# Patient Record
Sex: FEMALE | Race: WHITE | NOT HISPANIC OR LATINO | Employment: FULL TIME | URBAN - NONMETROPOLITAN AREA
[De-identification: names, ages, dates, MRNs, and addresses within clinical notes are randomized per-mention and may not be internally consistent; named-entity substitution may affect disease eponyms.]

---

## 2017-01-10 ENCOUNTER — HOSPITAL ENCOUNTER (OUTPATIENT)
Dept: URGENT CARE | Facility: CLINIC | Age: 32
Discharge: HOME | End: 2017-01-10
Attending: FAMILY MEDICINE | Admitting: FAMILY MEDICINE

## 2017-02-28 ENCOUNTER — OFFICE VISIT (OUTPATIENT)
Dept: FAMILY MEDICINE CLINIC | Facility: CLINIC | Age: 32
End: 2017-02-28

## 2017-02-28 VITALS
WEIGHT: 293 LBS | DIASTOLIC BLOOD PRESSURE: 80 MMHG | HEIGHT: 67 IN | BODY MASS INDEX: 45.99 KG/M2 | SYSTOLIC BLOOD PRESSURE: 115 MMHG | HEART RATE: 85 BPM | OXYGEN SATURATION: 100 %

## 2017-02-28 DIAGNOSIS — Z00.00 ANNUAL PHYSICAL EXAM: Primary | ICD-10-CM

## 2017-02-28 DIAGNOSIS — E66.01 MORBID OBESITY DUE TO EXCESS CALORIES (HCC): ICD-10-CM

## 2017-02-28 PROCEDURE — 99395 PREV VISIT EST AGE 18-39: CPT | Performed by: GENERAL PRACTICE

## 2017-04-27 ENCOUNTER — OFFICE VISIT (OUTPATIENT)
Dept: FAMILY MEDICINE CLINIC | Facility: CLINIC | Age: 32
End: 2017-04-27

## 2017-04-27 VITALS
DIASTOLIC BLOOD PRESSURE: 70 MMHG | HEART RATE: 83 BPM | BODY MASS INDEX: 45.99 KG/M2 | WEIGHT: 293 LBS | SYSTOLIC BLOOD PRESSURE: 110 MMHG | OXYGEN SATURATION: 98 % | HEIGHT: 67 IN

## 2017-04-27 DIAGNOSIS — E66.01 MORBID OBESITY DUE TO EXCESS CALORIES (HCC): Primary | ICD-10-CM

## 2017-04-27 DIAGNOSIS — G43.909 MIGRAINE WITHOUT STATUS MIGRAINOSUS, NOT INTRACTABLE, UNSPECIFIED MIGRAINE TYPE: ICD-10-CM

## 2017-04-27 PROCEDURE — 99213 OFFICE O/P EST LOW 20 MIN: CPT | Performed by: GENERAL PRACTICE

## 2017-04-27 RX ORDER — SUMATRIPTAN 50 MG/1
TABLET, FILM COATED ORAL
Qty: 9 TABLET | Refills: 5 | Status: SHIPPED | OUTPATIENT
Start: 2017-04-27 | End: 2017-09-20 | Stop reason: SDUPTHER

## 2017-05-15 RX ORDER — LORCASERIN HYDROCHLORIDE HEMIHYDRATE 20 MG/1
TABLET, FILM COATED, EXTENDED RELEASE ORAL
Qty: 30 TABLET | Refills: 0 | OUTPATIENT
Start: 2017-05-15

## 2017-09-20 ENCOUNTER — TELEPHONE (OUTPATIENT)
Dept: FAMILY MEDICINE CLINIC | Facility: CLINIC | Age: 32
End: 2017-09-20

## 2017-09-20 RX ORDER — SUMATRIPTAN 50 MG/1
TABLET, FILM COATED ORAL
Qty: 9 TABLET | Refills: 1 | Status: SHIPPED | OUTPATIENT
Start: 2017-09-20 | End: 2018-02-23 | Stop reason: HOSPADM

## 2017-09-20 NOTE — TELEPHONE ENCOUNTER
Patient called, she states she only see's Dr. Ramirez yearly.    ----- Message from Patricia Woods sent at 9/20/2017 11:38 AM CDT -----  Contact: GRETCHEN Salvador needs refill for Imitrex to [facility] Dr Ramirez    [phone]

## 2017-10-02 RX ORDER — SUMATRIPTAN 50 MG/1
TABLET, FILM COATED ORAL
Qty: 9 TABLET | Refills: 0 | Status: SHIPPED | OUTPATIENT
Start: 2017-10-02 | End: 2018-02-23 | Stop reason: HOSPADM

## 2018-02-23 ENCOUNTER — INITIAL PRENATAL (OUTPATIENT)
Dept: OBSTETRICS AND GYNECOLOGY | Facility: CLINIC | Age: 33
End: 2018-02-23

## 2018-02-23 ENCOUNTER — APPOINTMENT (OUTPATIENT)
Dept: LAB | Facility: HOSPITAL | Age: 33
End: 2018-02-23

## 2018-02-23 VITALS — BODY MASS INDEX: 47.46 KG/M2 | SYSTOLIC BLOOD PRESSURE: 84 MMHG | DIASTOLIC BLOOD PRESSURE: 55 MMHG | WEIGHT: 293 LBS

## 2018-02-23 DIAGNOSIS — O34.219 HISTORY OF CESAREAN DELIVERY AFFECTING PREGNANCY: ICD-10-CM

## 2018-02-23 DIAGNOSIS — Z3A.00 WEEKS OF GESTATION OF PREGNANCY NOT SPECIFIED: ICD-10-CM

## 2018-02-23 DIAGNOSIS — Z86.32 HX GESTATIONAL DIABETES: Primary | ICD-10-CM

## 2018-02-23 DIAGNOSIS — Z34.80 PRENATAL CARE OF MULTIGRAVIDA, ANTEPARTUM: ICD-10-CM

## 2018-02-23 LAB
ABO GROUP BLD: NORMAL
AMPHET+METHAMPHET UR QL: NEGATIVE
BARBITURATES UR QL SCN: NEGATIVE
BASOPHILS # BLD AUTO: 0.01 10*3/MM3 (ref 0–0.2)
BASOPHILS NFR BLD AUTO: 0.1 % (ref 0–2)
BENZODIAZ UR QL SCN: NEGATIVE
BILIRUB UR QL STRIP: NEGATIVE
BLD GP AB SCN SERPL QL: NEGATIVE
CANNABINOIDS SERPL QL: NEGATIVE
CLARITY UR: CLEAR
COCAINE UR QL: NEGATIVE
COLOR UR: YELLOW
DEPRECATED RDW RBC AUTO: 40.8 FL (ref 36.4–46.3)
EOSINOPHIL # BLD AUTO: 0.06 10*3/MM3 (ref 0–0.7)
EOSINOPHIL NFR BLD AUTO: 0.7 % (ref 0–7)
ERYTHROCYTE [DISTWIDTH] IN BLOOD BY AUTOMATED COUNT: 13.4 % (ref 11.5–14.5)
GLUCOSE UR STRIP-MCNC: NEGATIVE MG/DL
HBV SURFACE AG SERPL QL IA: NEGATIVE
HCT VFR BLD AUTO: 41.2 % (ref 35–45)
HCV AB SER DONR QL: NEGATIVE
HGB BLD-MCNC: 13.5 G/DL (ref 12–15.5)
HGB UR QL STRIP.AUTO: NEGATIVE
HIV1+2 AB SER QL: NEGATIVE
IMM GRANULOCYTES # BLD: 0.02 10*3/MM3 (ref 0–0.02)
IMM GRANULOCYTES NFR BLD: 0.2 % (ref 0–0.5)
KETONES UR QL STRIP: NEGATIVE
LEUKOCYTE ESTERASE UR QL STRIP.AUTO: NEGATIVE
LYMPHOCYTES # BLD AUTO: 1.7 10*3/MM3 (ref 0.6–4.2)
LYMPHOCYTES NFR BLD AUTO: 19.7 % (ref 10–50)
Lab: NORMAL
MCH RBC QN AUTO: 27.5 PG (ref 26.5–34)
MCHC RBC AUTO-ENTMCNC: 32.8 G/DL (ref 31.4–36)
MCV RBC AUTO: 83.9 FL (ref 80–98)
METHADONE UR QL SCN: NEGATIVE
MONOCYTES # BLD AUTO: 0.72 10*3/MM3 (ref 0–0.9)
MONOCYTES NFR BLD AUTO: 8.3 % (ref 0–12)
NEUTROPHILS # BLD AUTO: 6.13 10*3/MM3 (ref 2–8.6)
NEUTROPHILS NFR BLD AUTO: 71 % (ref 37–80)
NITRITE UR QL STRIP: NEGATIVE
OPIATES UR QL: NEGATIVE
OXYCODONE UR QL SCN: NEGATIVE
PH UR STRIP.AUTO: 6 [PH] (ref 5–9)
PLATELET # BLD AUTO: 303 10*3/MM3 (ref 150–450)
PMV BLD AUTO: 10.2 FL (ref 8–12)
PROT UR QL STRIP: NEGATIVE
RBC # BLD AUTO: 4.91 10*6/MM3 (ref 3.77–5.16)
RH BLD: POSITIVE
RUBV IGG SER QL: ABNORMAL
RUBV IGG SER-ACNC: 67.6 IU/ML (ref 0–9.9)
SP GR UR STRIP: 1.01 (ref 1–1.03)
UROBILINOGEN UR QL STRIP: NORMAL
WBC NRBC COR # BLD: 8.64 10*3/MM3 (ref 3.2–9.8)

## 2018-02-23 PROCEDURE — 86762 RUBELLA ANTIBODY: CPT | Performed by: ADVANCED PRACTICE MIDWIFE

## 2018-02-23 PROCEDURE — 87086 URINE CULTURE/COLONY COUNT: CPT | Performed by: ADVANCED PRACTICE MIDWIFE

## 2018-02-23 PROCEDURE — 80307 DRUG TEST PRSMV CHEM ANLYZR: CPT | Performed by: ADVANCED PRACTICE MIDWIFE

## 2018-02-23 PROCEDURE — 86900 BLOOD TYPING SEROLOGIC ABO: CPT | Performed by: ADVANCED PRACTICE MIDWIFE

## 2018-02-23 PROCEDURE — 81003 URINALYSIS AUTO W/O SCOPE: CPT | Performed by: ADVANCED PRACTICE MIDWIFE

## 2018-02-23 PROCEDURE — 87340 HEPATITIS B SURFACE AG IA: CPT | Performed by: ADVANCED PRACTICE MIDWIFE

## 2018-02-23 PROCEDURE — 86803 HEPATITIS C AB TEST: CPT | Performed by: ADVANCED PRACTICE MIDWIFE

## 2018-02-23 PROCEDURE — 85025 COMPLETE CBC W/AUTO DIFF WBC: CPT | Performed by: ADVANCED PRACTICE MIDWIFE

## 2018-02-23 PROCEDURE — 86901 BLOOD TYPING SEROLOGIC RH(D): CPT | Performed by: ADVANCED PRACTICE MIDWIFE

## 2018-02-23 PROCEDURE — 86850 RBC ANTIBODY SCREEN: CPT | Performed by: ADVANCED PRACTICE MIDWIFE

## 2018-02-23 PROCEDURE — 87491 CHLMYD TRACH DNA AMP PROBE: CPT | Performed by: ADVANCED PRACTICE MIDWIFE

## 2018-02-23 PROCEDURE — 36415 COLL VENOUS BLD VENIPUNCTURE: CPT | Performed by: ADVANCED PRACTICE MIDWIFE

## 2018-02-23 PROCEDURE — 87661 TRICHOMONAS VAGINALIS AMPLIF: CPT | Performed by: ADVANCED PRACTICE MIDWIFE

## 2018-02-23 PROCEDURE — G0432 EIA HIV-1/HIV-2 SCREEN: HCPCS | Performed by: ADVANCED PRACTICE MIDWIFE

## 2018-02-23 PROCEDURE — 0501F PRENATAL FLOW SHEET: CPT | Performed by: ADVANCED PRACTICE MIDWIFE

## 2018-02-23 PROCEDURE — 87591 N.GONORRHOEAE DNA AMP PROB: CPT | Performed by: ADVANCED PRACTICE MIDWIFE

## 2018-02-23 RX ORDER — ONDANSETRON 4 MG/1
4 TABLET, FILM COATED ORAL DAILY PRN
Qty: 30 TABLET | Refills: 1 | Status: SHIPPED | OUTPATIENT
Start: 2018-02-23 | End: 2018-05-08

## 2018-02-23 RX ORDER — PRENATAL VIT/IRON FUM/FOLIC AC 65 MG-1 MG
1 TABLET ORAL DAILY
Qty: 30 EACH | Refills: 12 | Status: SHIPPED | OUTPATIENT
Start: 2018-02-23 | End: 2019-02-21

## 2018-02-23 NOTE — PROGRESS NOTES
CC: New OB visit, history reviewed      ROS:Positive nausea w/o vomiting   Negative leaking fluid from the vagina, swelling in her legs, headache and visual changes  Objective: See prenatal physical    Educated on: Spent 30 minutes out of 45 minutes face to face counseling on nutrition, activity, diet, safety, prenatal care, medications approved in pregnancy, pregnancy discomforts, and testing. Educated on the need for an early GTT since patient has a history of GDM & to avoid using Imitrex for migraines    A/Plan: f/u in 1 week for dating US   Madeleine was seen today for initial prenatal visit.    Diagnoses and all orders for this visit:    Hx gestational diabetes  -     Glucose, Post 50 Gm Glucola; Future    Weeks of gestation of pregnancy not specified  -      Ob Transvaginal; Future  -     Cancel: Type & Screen  -     OB Panel With HIV  -     Cancel: Urine Culture - Urine, Urine, Clean Catch  -     Cancel: Urinalysis With Microscopic - Urine, Clean Catch  -     Urine Drug Screen - Urine, Clean Catch  -     RPR  -     CBC Auto Differential  -     Hepatitis B Surface Antigen  -     Rubella Antibody, IgG  -     OB Panel Type & Screen  -     HIV-1 & HIV-2 Antibodies  -     PREVIOUS HISTORY; Future  -     PREVIOUS HISTORY    History of  delivery affecting pregnancy    Prenatal care of multigravida, antepartum  -     Hepatitis C Antibody  -     Urine Culture - Urine, Urine, Clean Catch  -     Urinalysis - Urine, Clean Catch  -     Chlamydia trachomatis, Neisseria gonorrhoeae, Trichomonas vaginalis, PCR - Urine, Urine, Clean Catch    Other orders  -     ondansetron (ZOFRAN) 4 MG tablet; Take 1 tablet by mouth Daily As Needed for Nausea or Vomiting.  -     Prenatal Vit-Fe Fumarate-FA (MYNATAL PLUS) tablet; Take 1 tablet by mouth Daily.

## 2018-02-24 LAB
BACTERIA SPEC AEROBE CULT: NORMAL
BACTERIA SPEC AEROBE CULT: NORMAL
C TRACH RRNA CVX QL NAA+PROBE: NEGATIVE
N GONORRHOEA RRNA SPEC QL NAA+PROBE: NEGATIVE
T VAGINALIS DNA VAG QL PROBE+SIG AMP: NEGATIVE

## 2018-02-25 LAB — RPR SER QL: NORMAL

## 2018-03-06 ENCOUNTER — LAB (OUTPATIENT)
Dept: LAB | Facility: HOSPITAL | Age: 33
End: 2018-03-06

## 2018-03-06 DIAGNOSIS — Z86.32 HX GESTATIONAL DIABETES: ICD-10-CM

## 2018-03-06 LAB — GLUCOSE 1H P 100 G GLC PO SERPL-MCNC: 117 MG/DL (ref 60–140)

## 2018-03-06 PROCEDURE — 36415 COLL VENOUS BLD VENIPUNCTURE: CPT

## 2018-03-06 PROCEDURE — 82950 GLUCOSE TEST: CPT

## 2018-03-07 ENCOUNTER — ROUTINE PRENATAL (OUTPATIENT)
Dept: OBSTETRICS AND GYNECOLOGY | Facility: CLINIC | Age: 33
End: 2018-03-07

## 2018-03-07 VITALS — BODY MASS INDEX: 47.3 KG/M2 | WEIGHT: 293 LBS | DIASTOLIC BLOOD PRESSURE: 83 MMHG | SYSTOLIC BLOOD PRESSURE: 126 MMHG

## 2018-03-07 DIAGNOSIS — Z34.80 ENCOUNTER FOR SUPERVISION OF NORMAL INTRAUTERINE PREGNANCY IN MULTIGRAVIDA, ANTEPARTUM: Primary | ICD-10-CM

## 2018-03-07 DIAGNOSIS — Z3A.00 WEEKS OF GESTATION OF PREGNANCY NOT SPECIFIED: ICD-10-CM

## 2018-03-07 PROCEDURE — 0502F SUBSEQUENT PRENATAL CARE: CPT | Performed by: ADVANCED PRACTICE MIDWIFE

## 2018-03-08 NOTE — PROGRESS NOTES
CC: ADELFO visit, history reviewed     ROS:Positive Denies   Negative leaking fluid from the vagina and swelling in her legs      Educated on:US & lab results    A/Plan: f/u in 4 week/s   Madeleine was seen today for routine prenatal visit.    Diagnoses and all orders for this visit:    Encounter for supervision of normal intrauterine pregnancy in multigravida, antepartum    Weeks of gestation of pregnancy not specified

## 2018-04-05 ENCOUNTER — ROUTINE PRENATAL (OUTPATIENT)
Dept: OBSTETRICS AND GYNECOLOGY | Facility: CLINIC | Age: 33
End: 2018-04-05

## 2018-04-05 VITALS — WEIGHT: 293 LBS | DIASTOLIC BLOOD PRESSURE: 77 MMHG | BODY MASS INDEX: 46.67 KG/M2 | SYSTOLIC BLOOD PRESSURE: 125 MMHG

## 2018-04-05 DIAGNOSIS — O21.9 NAUSEA AND VOMITING DURING PREGNANCY: Primary | ICD-10-CM

## 2018-04-05 DIAGNOSIS — Z3A.13 13 WEEKS GESTATION OF PREGNANCY: ICD-10-CM

## 2018-04-05 DIAGNOSIS — O26.811 FATIGUE DURING PREGNANCY IN FIRST TRIMESTER: ICD-10-CM

## 2018-04-05 PROCEDURE — 0502F SUBSEQUENT PRENATAL CARE: CPT | Performed by: ADVANCED PRACTICE MIDWIFE

## 2018-04-05 NOTE — PROGRESS NOTES
CC: ADELFO visit, history reviewed, no changes noted    ROS:Positive daily N&V & fatigue   Negative leaking fluid from the vagina, swelling in her legs, headache, visual changes, low back pain and heartburn      Educated on:Comfort measures, dietary changes, & medications for nausea, Vit B 6 for nausea & fatique    A/Plan: f/u in 5 week/s   Madeleine was seen today for routine prenatal visit.    Diagnoses and all orders for this visit:    Nausea and vomiting during pregnancy    13 weeks gestation of pregnancy  -     US Ob 14 + Weeks Single or First Gestation; Future

## 2018-05-08 ENCOUNTER — ROUTINE PRENATAL (OUTPATIENT)
Dept: OBSTETRICS AND GYNECOLOGY | Facility: CLINIC | Age: 33
End: 2018-05-08

## 2018-05-08 VITALS — WEIGHT: 293 LBS | BODY MASS INDEX: 47.3 KG/M2 | SYSTOLIC BLOOD PRESSURE: 120 MMHG | DIASTOLIC BLOOD PRESSURE: 68 MMHG

## 2018-05-08 DIAGNOSIS — O99.212 OBESITY AFFECTING PREGNANCY IN SECOND TRIMESTER: ICD-10-CM

## 2018-05-08 DIAGNOSIS — Z3A.17 17 WEEKS GESTATION OF PREGNANCY: Primary | ICD-10-CM

## 2018-05-08 PROCEDURE — 0502F SUBSEQUENT PRENATAL CARE: CPT | Performed by: ADVANCED PRACTICE MIDWIFE

## 2018-05-08 RX ORDER — PNV NO.95/FERROUS FUM/FOLIC AC 28MG-0.8MG
TABLET ORAL
Refills: 10 | COMMUNITY
Start: 2018-04-28 | End: 2018-05-08 | Stop reason: SDUPTHER

## 2018-05-10 ENCOUNTER — OFFICE VISIT (OUTPATIENT)
Dept: FAMILY MEDICINE CLINIC | Facility: CLINIC | Age: 33
End: 2018-05-10

## 2018-05-10 VITALS
SYSTOLIC BLOOD PRESSURE: 110 MMHG | OXYGEN SATURATION: 98 % | HEIGHT: 66 IN | WEIGHT: 293 LBS | BODY MASS INDEX: 47.09 KG/M2 | HEART RATE: 80 BPM | DIASTOLIC BLOOD PRESSURE: 70 MMHG

## 2018-05-10 DIAGNOSIS — R21 FACIAL RASH: Primary | ICD-10-CM

## 2018-05-10 PROCEDURE — 99213 OFFICE O/P EST LOW 20 MIN: CPT | Performed by: GENERAL PRACTICE

## 2018-05-10 RX ORDER — DIAPER,BRIEF,INFANT-TODD,DISP
EACH MISCELLANEOUS 2 TIMES DAILY
Qty: 30 G | Refills: 0 | Status: SHIPPED | OUTPATIENT
Start: 2018-05-10 | End: 2018-10-06 | Stop reason: HOSPADM

## 2018-05-10 NOTE — PROGRESS NOTES
Subjective   Madeleine Brooks is a 32 y.o. female.   Chief Complaint   Patient presents with   • Follow-up     pjossible shingles     Rash   This is a new problem. The current episode started in the past 7 days. The problem is unchanged. The affected locations include the face. The rash is characterized by burning, itchiness and redness. She was exposed to nothing. Pertinent negatives include no anorexia, congestion, cough, diarrhea, facial edema, fatigue, fever, joint pain, rhinorrhea, shortness of breath, sore throat or vomiting. Past treatments include moisturizer. The treatment provided mild relief.   Is 18 weeks pregnant. No new exposures.     The following portions of the patient's history were reviewed and updated as appropriate: allergies, current medications, past social history and problem list.    Outpatient Medications Prior to Visit   Medication Sig Dispense Refill   • Prenatal Vit-Fe Fumarate-FA (MYNATAL PLUS) tablet Take 1 tablet by mouth Daily. 30 each 12     No facility-administered medications prior to visit.        Review of Systems   Constitutional: Negative.  Negative for chills, fatigue, fever and unexpected weight change.   HENT: Negative.  Negative for congestion, ear pain, hearing loss, nosebleeds, rhinorrhea, sneezing, sore throat and tinnitus.    Eyes: Negative.  Negative for discharge.   Respiratory: Negative.  Negative for cough, shortness of breath and wheezing.    Cardiovascular: Negative.  Negative for chest pain and palpitations.   Gastrointestinal: Negative.  Negative for abdominal pain, anorexia, constipation, diarrhea, nausea and vomiting.   Endocrine: Negative.    Genitourinary: Negative.  Negative for dysuria, frequency and urgency.   Musculoskeletal: Negative.  Negative for arthralgias, back pain, joint pain, joint swelling, myalgias and neck pain.   Skin: Positive for rash.   Allergic/Immunologic: Negative.    Neurological: Negative.  Negative for dizziness, weakness,  "numbness and headaches.   Hematological: Negative.  Negative for adenopathy.   Psychiatric/Behavioral: Negative.  Negative for dysphoric mood and sleep disturbance. The patient is not nervous/anxious.        Objective   Visit Vitals  /70   Pulse 80   Ht 167.6 cm (66\")   Wt (!) 137 kg (302 lb)   LMP  (LMP Unknown)   SpO2 98%   BMI 48.74 kg/m²     Physical Exam   Constitutional: She is oriented to person, place, and time. She appears well-developed and well-nourished. No distress.   HENT:   Head: Normocephalic and atraumatic.   Nose: Nose normal.   Mouth/Throat: Oropharynx is clear and moist.   Eyes: Conjunctivae and EOM are normal. Pupils are equal, round, and reactive to light. Right eye exhibits no discharge. Left eye exhibits no discharge.   Neck: No thyromegaly present.   Cardiovascular: Normal rate, regular rhythm, normal heart sounds and intact distal pulses.    Pulmonary/Chest: Effort normal and breath sounds normal.   Lymphadenopathy:     She has no cervical adenopathy.   Neurological: She is alert and oriented to person, place, and time.   Skin: Skin is warm and dry. Rash noted.   Pinkish rash over forehead, cheeks and chin - looks allergic, no vesicles   Psychiatric: She has a normal mood and affect.   Nursing note and vitals reviewed.    Assessment/Plan   Problem List Items Addressed This Visit     None      Visit Diagnoses     Facial rash    -  Primary    Relevant Medications    hydrocortisone 1 % cream         Topical hydrocortisone bid, nothing else on face except current moisturizer, avoid sun exposure. Recheck if not improving.      New Medications Ordered This Visit   Medications   • hydrocortisone 1 % cream     Sig: Apply  topically 2 (Two) Times a Day.     Dispense:  30 g     Refill:  0     No Follow-up on file.  "

## 2018-05-16 NOTE — PROGRESS NOTES
CC: ADELFO visit, history reviewed, no changes noted     ROS:Positive No complaints   Negative leaking fluid from the vagina, swelling in her legs, headache, visual changes, low back pain and heartburn      Educated on:US results    A/Plan: f/u in 5 week/s for F/U anatomy US  Madeleine was seen today for routine prenatal visit.    Diagnoses and all orders for this visit:    17 weeks gestation of pregnancy  -     US Ob Follow Up Transabdominal Approach; Future    Obesity affecting pregnancy in second trimester

## 2018-06-14 ENCOUNTER — ROUTINE PRENATAL (OUTPATIENT)
Dept: OBSTETRICS AND GYNECOLOGY | Facility: CLINIC | Age: 33
End: 2018-06-14

## 2018-06-14 VITALS — DIASTOLIC BLOOD PRESSURE: 68 MMHG | WEIGHT: 293 LBS | SYSTOLIC BLOOD PRESSURE: 110 MMHG | BODY MASS INDEX: 49.23 KG/M2

## 2018-06-14 DIAGNOSIS — Z3A.23 23 WEEKS GESTATION OF PREGNANCY: Primary | ICD-10-CM

## 2018-06-14 DIAGNOSIS — Z34.80 ENCOUNTER FOR SUPERVISION OF NORMAL PREGNANCY IN MULTIGRAVIDA: ICD-10-CM

## 2018-06-14 PROCEDURE — 0502F SUBSEQUENT PRENATAL CARE: CPT | Performed by: ADVANCED PRACTICE MIDWIFE

## 2018-06-18 ENCOUNTER — LAB (OUTPATIENT)
Dept: LAB | Facility: HOSPITAL | Age: 33
End: 2018-06-18

## 2018-06-18 DIAGNOSIS — Z34.80 ENCOUNTER FOR SUPERVISION OF NORMAL PREGNANCY IN MULTIGRAVIDA: ICD-10-CM

## 2018-06-18 DIAGNOSIS — Z3A.23 23 WEEKS GESTATION OF PREGNANCY: ICD-10-CM

## 2018-06-18 LAB
DEPRECATED RDW RBC AUTO: 44.8 FL (ref 36.4–46.3)
ERYTHROCYTE [DISTWIDTH] IN BLOOD BY AUTOMATED COUNT: 14.4 % (ref 11.5–14.5)
GLUCOSE 1H P 100 G GLC PO SERPL-MCNC: 142 MG/DL (ref 60–140)
HCT VFR BLD AUTO: 37.6 % (ref 35–45)
HGB BLD-MCNC: 12.4 G/DL (ref 12–15.5)
MCH RBC QN AUTO: 28.6 PG (ref 26.5–34)
MCHC RBC AUTO-ENTMCNC: 33 G/DL (ref 31.4–36)
MCV RBC AUTO: 86.8 FL (ref 80–98)
PLATELET # BLD AUTO: 231 10*3/MM3 (ref 150–450)
PMV BLD AUTO: 10.5 FL (ref 8–12)
RBC # BLD AUTO: 4.33 10*6/MM3 (ref 3.77–5.16)
WBC NRBC COR # BLD: 7.74 10*3/MM3 (ref 3.2–9.8)

## 2018-06-18 PROCEDURE — 82950 GLUCOSE TEST: CPT

## 2018-06-18 PROCEDURE — 85027 COMPLETE CBC AUTOMATED: CPT

## 2018-06-18 PROCEDURE — 36415 COLL VENOUS BLD VENIPUNCTURE: CPT

## 2018-06-19 DIAGNOSIS — Z3A.23 23 WEEKS GESTATION OF PREGNANCY: Primary | ICD-10-CM

## 2018-06-25 ENCOUNTER — LAB (OUTPATIENT)
Dept: LAB | Facility: HOSPITAL | Age: 33
End: 2018-06-25

## 2018-06-25 DIAGNOSIS — Z3A.23 23 WEEKS GESTATION OF PREGNANCY: Primary | ICD-10-CM

## 2018-06-25 LAB
GLUCOSE 1H P 100 G GLC PO SERPL-MCNC: 151 MG/DL (ref 60–140)
GLUCOSE 2H P 100 G GLC PO SERPL-MCNC: 130 MG/DL (ref 50–399)
GLUCOSE 3H P 100 G GLC PO SERPL-MCNC: 54 MG/DL (ref 50–400)
GLUCOSE P FAST SERPL-MCNC: 92 MG/DL (ref 60–110)

## 2018-06-25 PROCEDURE — 82951 GLUCOSE TOLERANCE TEST (GTT): CPT

## 2018-06-25 PROCEDURE — 82952 GTT-ADDED SAMPLES: CPT

## 2018-06-25 PROCEDURE — 36415 COLL VENOUS BLD VENIPUNCTURE: CPT

## 2018-07-12 ENCOUNTER — ROUTINE PRENATAL (OUTPATIENT)
Dept: OBSTETRICS AND GYNECOLOGY | Facility: CLINIC | Age: 33
End: 2018-07-12

## 2018-07-12 VITALS — WEIGHT: 293 LBS | BODY MASS INDEX: 49.07 KG/M2 | DIASTOLIC BLOOD PRESSURE: 72 MMHG | SYSTOLIC BLOOD PRESSURE: 118 MMHG

## 2018-07-12 DIAGNOSIS — O99.212 OBESITY AFFECTING PREGNANCY IN SECOND TRIMESTER: ICD-10-CM

## 2018-07-12 DIAGNOSIS — O34.211 MATERNAL CARE DUE TO LOW TRANSVERSE UTERINE SCAR FROM PREVIOUS CESAREAN DELIVERY: ICD-10-CM

## 2018-07-12 DIAGNOSIS — Z3A.27 27 WEEKS GESTATION OF PREGNANCY: Primary | ICD-10-CM

## 2018-07-12 PROCEDURE — 0502F SUBSEQUENT PRENATAL CARE: CPT | Performed by: OBSTETRICS & GYNECOLOGY

## 2018-07-12 NOTE — PROGRESS NOTES
Chief Complaint   Patient presents with   • High Risk Gestation     32-year-old  with 1 previous delivery by  section of a 9 lbs. 8 oz. female infant.    2013  section at full-term delivering a 9 lbs. 8 oz. female infant named Linda.    She desires repeat  section and permanent surgical sterilization.  Signed sterilization papers 2018.    Having a boy named Waldemar.    Elevated 1 hour Glucola at 142.  Normal 3 hour GTT.  normal CBC.    Plan ultrasound at 32 weeks' gestation.    The patient complains of the following: No complaints    ROS  Vaginal bleeding: No   Nausea: No   Diarrhea: No   Constipation: No   Other:      Lab Results   Component Value Date    ABO O 2018    RH Positive 2018    ABSCRN Negative 2018       Specific topics discussed at today's visit:Fetal kick counts, repeat  section, tubal sterilization  Tests done today: none  Tests to be done at the next visit: none    Madeleine was seen today for high risk gestation.    Diagnoses and all orders for this visit:    27 weeks gestation of pregnancy    Maternal care due to low transverse uterine scar from previous  delivery    Obesity affecting pregnancy in second trimester

## 2018-07-31 ENCOUNTER — ROUTINE PRENATAL (OUTPATIENT)
Dept: OBSTETRICS AND GYNECOLOGY | Facility: CLINIC | Age: 33
End: 2018-07-31

## 2018-07-31 VITALS — DIASTOLIC BLOOD PRESSURE: 63 MMHG | SYSTOLIC BLOOD PRESSURE: 122 MMHG | BODY MASS INDEX: 49.55 KG/M2 | WEIGHT: 293 LBS

## 2018-07-31 DIAGNOSIS — O34.211 MATERNAL CARE DUE TO LOW TRANSVERSE UTERINE SCAR FROM PREVIOUS CESAREAN DELIVERY: ICD-10-CM

## 2018-07-31 DIAGNOSIS — O99.213 OBESITY AFFECTING PREGNANCY IN THIRD TRIMESTER: Primary | ICD-10-CM

## 2018-07-31 DIAGNOSIS — Z3A.29 29 WEEKS GESTATION OF PREGNANCY: ICD-10-CM

## 2018-07-31 DIAGNOSIS — O36.62X0 EXCESSIVE FETAL GROWTH AFFECTING MANAGEMENT OF PREGNANCY IN SECOND TRIMESTER, SINGLE OR UNSPECIFIED FETUS: ICD-10-CM

## 2018-07-31 PROCEDURE — 0502F SUBSEQUENT PRENATAL CARE: CPT | Performed by: OBSTETRICS & GYNECOLOGY

## 2018-07-31 NOTE — PROGRESS NOTES
Chief Complaint   Patient presents with   • High Risk Gestation     32-year-old  with 1 previous delivery by  section of a 9 lbs. 8 oz. female infant.     2013  section at full-term delivering a 9 lbs. 8 oz. female infant named Linda.     She desires repeat  section and permanent surgical sterilization.  Signed sterilization papers 2018.     Having a boy named Waldemar.     Elevated 1 hour Glucola at 142.  Normal 3 hour GTT.  normal CBC.     Plan ultrasound at 32 weeks' gestation.    ROS  Headache: No   Visual changes: No   Swelling in legs: No   Nausea: No   Constipation: No   Diarrhea: No   Contractions: No   Leaking fluid: No   Vaginal bleeding: No   Other:      Lab Results   Component Value Date    HGB 12.4 2018    HCT 37.6 2018    ABO O 2018    RH Positive 2018    ABSCRN Negative 2018       Specific topics discussed at today's visit: Fetal kick counts,  labor precautions  Tests done today: none    Madeleine was seen today for high risk gestation.    Diagnoses and all orders for this visit:    Obesity affecting pregnancy in third trimester  -     US Ob Follow Up Transabdominal Approach    29 weeks gestation of pregnancy    Excessive fetal growth affecting management of pregnancy in third trimester, single or unspecified fetus  -     US Ob Follow Up Transabdominal Approach    Maternal care due to low transverse uterine scar from previous  delivery

## 2018-08-16 ENCOUNTER — ROUTINE PRENATAL (OUTPATIENT)
Dept: OBSTETRICS AND GYNECOLOGY | Facility: CLINIC | Age: 33
End: 2018-08-16

## 2018-08-16 VITALS — WEIGHT: 293 LBS | SYSTOLIC BLOOD PRESSURE: 102 MMHG | BODY MASS INDEX: 49.23 KG/M2 | DIASTOLIC BLOOD PRESSURE: 68 MMHG

## 2018-08-16 DIAGNOSIS — O36.62X0 EXCESSIVE FETAL GROWTH AFFECTING MANAGEMENT OF PREGNANCY IN SECOND TRIMESTER, SINGLE OR UNSPECIFIED FETUS: ICD-10-CM

## 2018-08-16 DIAGNOSIS — O99.213 OBESITY AFFECTING PREGNANCY IN THIRD TRIMESTER: ICD-10-CM

## 2018-08-16 DIAGNOSIS — Z3A.32 32 WEEKS GESTATION OF PREGNANCY: Primary | ICD-10-CM

## 2018-08-16 DIAGNOSIS — O34.211 MATERNAL CARE DUE TO LOW TRANSVERSE UTERINE SCAR FROM PREVIOUS CESAREAN DELIVERY: ICD-10-CM

## 2018-08-16 PROCEDURE — 0502F SUBSEQUENT PRENATAL CARE: CPT | Performed by: OBSTETRICS & GYNECOLOGY

## 2018-08-16 NOTE — PROGRESS NOTES
Chief Complaint   Patient presents with   • High Risk Gestation     32-year-old  with 1 previous delivery by  section of a 9 lbs. 8 oz. female infant.     2013  section at full-term delivering a 9 lbs. 8 oz. female infant named Linda.     She desires repeat  section and permanent surgical sterilization.  Signed sterilization papers 2018.     Having a boy named Waldemar.     Elevated 1 hour Glucola at 142.  Normal 3 hour GTT.  normal CBC.     Plan ultrasound at 32 weeks' gestation.    Ultrasound 2018 that showed a single intrauterine pregnancy with estimated fetal weight 5 lbs. 7 oz. which is 82nd percentile.  Amniotic fluid index 19.0 cm.  No abnormalities noted.    Plan repeat  section and permanent surgical sterilization     The patient complains of the following: No complaints except she is beginning to have some breast leakage.    ROS  Headache: No   Visual changes: No   Swelling in legs: No   Nausea: No   Constipation: No   Diarrhea: No   Contractions: No   Leaking fluid: No   Vaginal bleeding: No   Other:      Specific topics discussed at today's visit: Fetal kick counts and  labor precautions  Tests done today: Obstetrical ultrasound  Tests to be done at the next visit: GBS swab    Madeleine was seen today for high risk gestation.    Diagnoses and all orders for this visit:    32 weeks gestation of pregnancy    Maternal care due to low transverse uterine scar from previous  delivery    Obesity affecting pregnancy in third trimester    Excessive fetal growth affecting management of pregnancy in third trimester, single or unspecified fetus

## 2018-09-04 ENCOUNTER — ROUTINE PRENATAL (OUTPATIENT)
Dept: OBSTETRICS AND GYNECOLOGY | Facility: CLINIC | Age: 33
End: 2018-09-04

## 2018-09-04 VITALS — SYSTOLIC BLOOD PRESSURE: 124 MMHG | DIASTOLIC BLOOD PRESSURE: 78 MMHG | BODY MASS INDEX: 49.23 KG/M2 | WEIGHT: 293 LBS

## 2018-09-04 DIAGNOSIS — O36.63X1: ICD-10-CM

## 2018-09-04 DIAGNOSIS — O99.213 OBESITY AFFECTING PREGNANCY IN THIRD TRIMESTER: ICD-10-CM

## 2018-09-04 DIAGNOSIS — Z3A.34 34 WEEKS GESTATION OF PREGNANCY: Primary | ICD-10-CM

## 2018-09-04 DIAGNOSIS — O34.211 MATERNAL CARE DUE TO LOW TRANSVERSE UTERINE SCAR FROM PREVIOUS CESAREAN DELIVERY: ICD-10-CM

## 2018-09-04 PROCEDURE — 99213 OFFICE O/P EST LOW 20 MIN: CPT | Performed by: OBSTETRICS & GYNECOLOGY

## 2018-09-04 PROCEDURE — 87653 STREP B DNA AMP PROBE: CPT | Performed by: OBSTETRICS & GYNECOLOGY

## 2018-09-04 NOTE — PROGRESS NOTES
Chief Complaint   Patient presents with   • OB Follow up   • High Risk Gestation   32-year-old  with 1 previous delivery by  section of a 9 lbs. 8 oz. female infant.     2013  section at full-term delivering a 9 lbs. 8 oz. female infant named Linda.     She desires repeat  section and permanent surgical sterilization.  Signed sterilization papers 2018.     Having a boy named Waldemar.     Elevated 1 hour Glucola at 142.  Normal 3 hour GTT.  normal CBC.     Plan ultrasound at 32 weeks' gestation.     Ultrasound 2018 that showed a single intrauterine pregnancy with estimated fetal weight 5 lbs. 7 oz. which is 82nd percentile.  Amniotic fluid index 19.0 cm.  No abnormalities noted.     Plan repeat  section and permanent surgical sterilization Thursday, 2018.    GBS swab performed 2018.    2018, size greater than dates, plan growth ultrasound in 2 weeks.    The patient complains of the following: No complaints    ROS  Headache: No   Visual changes: No   Swelling in legs: No   Nausea: No   Constipation: No   Diarrhea: No   Contractions: No   Leaking fluid: No   Vaginal bleeding: No   Other:      Specific topics discussed at today's visit: Fetal kick counts and  labor precautions.  Tests done today: GBS testing  Tests to be done at the next visit: Obstetrical ultrasound for fetal growth    Madeleine was seen today for ob follow up and high risk gestation.    Diagnoses and all orders for this visit:    34 weeks gestation of pregnancy  -     Group B Strep (Molecular) - Swab, Vaginal/Rectum    Excessive fetal growth affecting management of mother, antepartum, third trimester, fetus 1  -     US Ob Follow Up Transabdominal Approach; Future    Maternal care due to low transverse uterine scar from previous  delivery    Obesity affecting pregnancy in third trimester

## 2018-09-05 LAB — GROUP B STREP, DNA: NEGATIVE

## 2018-09-18 ENCOUNTER — ROUTINE PRENATAL (OUTPATIENT)
Dept: OBSTETRICS AND GYNECOLOGY | Facility: CLINIC | Age: 33
End: 2018-09-18

## 2018-09-18 VITALS — SYSTOLIC BLOOD PRESSURE: 124 MMHG | WEIGHT: 293 LBS | BODY MASS INDEX: 50.52 KG/M2 | DIASTOLIC BLOOD PRESSURE: 75 MMHG

## 2018-09-18 DIAGNOSIS — Z3A.36 36 WEEKS GESTATION OF PREGNANCY: Primary | ICD-10-CM

## 2018-09-18 DIAGNOSIS — O36.63X1: ICD-10-CM

## 2018-09-18 DIAGNOSIS — O34.211 MATERNAL CARE DUE TO LOW TRANSVERSE UTERINE SCAR FROM PREVIOUS CESAREAN DELIVERY: ICD-10-CM

## 2018-09-18 DIAGNOSIS — O99.213 OBESITY AFFECTING PREGNANCY IN THIRD TRIMESTER: ICD-10-CM

## 2018-09-18 PROCEDURE — 99213 OFFICE O/P EST LOW 20 MIN: CPT | Performed by: OBSTETRICS & GYNECOLOGY

## 2018-09-18 NOTE — PROGRESS NOTES
Chief Complaint   Patient presents with   • High Risk Gestation     32-year-old  with 1 previous delivery by  section of a 9 lbs. 8 oz. female infant.     2013  section at full-term delivering a 9 lbs. 8 oz. female infant named Linda.     She desires repeat  section and permanent surgical sterilization.  Signed sterilization papers 2018.     Having a boy named Waldemar.     Elevated 1 hour Glucola at 142.  Normal 3 hour GTT.  normal CBC.     Plan ultrasound at 32 weeks' gestation.     Ultrasound 2018 that showed a single intrauterine pregnancy with estimated fetal weight 5 lbs. 7 oz. which is 82nd percentile.  Amniotic fluid index 19.0 cm.  No abnormalities noted.     Plan repeat  section and permanent surgical sterilization Thursday, 2018.     GBS swab negative 2018.     2018, size greater than dates, plan growth ultrasound in 2 weeks.    2018 ultrasound shows single intrauterine pregnancy in the cephalic position with estimated fetal weight 8 lbs. 6 oz. or 94th percentile.  DAINA normal at 12.4 cm.    The patient complains of the following: No new complaints    ROS  Headache: No   Visual changes: No   Swelling in legs: No   Nausea: No   Constipation: No   Diarrhea: No   Contractions: No   Leaking fluid: No   Vaginal bleeding: No   Other:      Specific topics discussed at today's visit: fetal kick counts and labor precautions  Tests done today: Ultrasound  Tests to be done at the next visit: none    Madeleine was seen today for high risk gestation.    Diagnoses and all orders for this visit:    36 weeks gestation of pregnancy    Maternal care due to low transverse uterine scar from previous  delivery    Excessive fetal growth affecting management of mother, antepartum, third trimester, fetus 1    Obesity affecting pregnancy in third trimester

## 2018-09-27 ENCOUNTER — ROUTINE PRENATAL (OUTPATIENT)
Dept: OBSTETRICS AND GYNECOLOGY | Facility: CLINIC | Age: 33
End: 2018-09-27

## 2018-09-27 VITALS — WEIGHT: 293 LBS | SYSTOLIC BLOOD PRESSURE: 122 MMHG | DIASTOLIC BLOOD PRESSURE: 75 MMHG | BODY MASS INDEX: 50.68 KG/M2

## 2018-09-27 DIAGNOSIS — O99.213 OBESITY AFFECTING PREGNANCY IN THIRD TRIMESTER: ICD-10-CM

## 2018-09-27 DIAGNOSIS — Z3A.39 39 WEEKS GESTATION OF PREGNANCY: Primary | ICD-10-CM

## 2018-09-27 DIAGNOSIS — O34.211 MATERNAL CARE DUE TO LOW TRANSVERSE UTERINE SCAR FROM PREVIOUS CESAREAN DELIVERY: ICD-10-CM

## 2018-09-27 DIAGNOSIS — Z3A.38 38 WEEKS GESTATION OF PREGNANCY: ICD-10-CM

## 2018-09-27 DIAGNOSIS — O36.63X1: ICD-10-CM

## 2018-09-27 PROCEDURE — 99213 OFFICE O/P EST LOW 20 MIN: CPT | Performed by: OBSTETRICS & GYNECOLOGY

## 2018-09-27 RX ORDER — MISOPROSTOL 100 UG/1
800 TABLET ORAL AS NEEDED
Status: CANCELLED | OUTPATIENT
Start: 2018-09-27

## 2018-09-27 RX ORDER — METHYLERGONOVINE MALEATE 0.2 MG/ML
200 INJECTION INTRAVENOUS ONCE AS NEEDED
Status: CANCELLED | OUTPATIENT
Start: 2018-09-27

## 2018-09-27 RX ORDER — CARBOPROST TROMETHAMINE 250 UG/ML
250 INJECTION, SOLUTION INTRAMUSCULAR AS NEEDED
Status: CANCELLED | OUTPATIENT
Start: 2018-09-27

## 2018-09-27 RX ORDER — CLOTRIMAZOLE AND BETAMETHASONE DIPROPIONATE 10; .64 MG/G; MG/G
CREAM TOPICAL 2 TIMES DAILY
Qty: 1 EACH | Refills: 11 | Status: SHIPPED | OUTPATIENT
Start: 2018-09-27 | End: 2018-10-06 | Stop reason: HOSPADM

## 2018-09-27 RX ORDER — CELECOXIB 100 MG/1
400 CAPSULE ORAL ONCE
Status: CANCELLED | OUTPATIENT
Start: 2018-09-27

## 2018-09-27 RX ORDER — LIDOCAINE HYDROCHLORIDE 10 MG/ML
5 INJECTION, SOLUTION EPIDURAL; INFILTRATION; INTRACAUDAL; PERINEURAL AS NEEDED
Status: CANCELLED | OUTPATIENT
Start: 2018-09-27

## 2018-09-27 RX ORDER — SODIUM CHLORIDE 0.9 % (FLUSH) 0.9 %
1-10 SYRINGE (ML) INJECTION AS NEEDED
Status: CANCELLED | OUTPATIENT
Start: 2018-09-27

## 2018-09-27 NOTE — PROGRESS NOTES
Chief Complaint   Patient presents with   • High Risk Gestation     32-year-old  with one previous delivery by  section of a 9 lbs. 8 oz. female infant.     2013  section at full-term delivering a 9 lbs. 8 oz. female infant named Linda.     She desires repeat  section and permanent surgical sterilization.  Signed sterilization papers 2018.     Having a boy named Waldemar.     Elevated 1 hour Glucola at 142.  Normal 3 hour GTT.  normal CBC.     Plan ultrasound at 32 weeks' gestation.     Ultrasound 2018 that showed a single intrauterine pregnancy with estimated fetal weight 5 lbs. 7 oz. which is 82nd percentile.  Amniotic fluid index 19.0 cm.  No abnormalities noted.     Plan repeat  section and permanent surgical sterilization Thursday, 2018.     GBS swab negative 2018.     2018, size greater than dates, plan growth ultrasound in 2 weeks.     2018 ultrasound shows single intrauterine pregnancy in the cephalic position with estimated fetal weight 8 lbs. 6 oz. or 94th percentile.  DAINA normal at 12.4 cm.    The patient complains of the following: No new complaints    ROS  Headache: No   Visual changes: No   Swelling in legs: No   Nausea: No   Constipation: No   Diarrhea: No   Contractions: Very irregular   Leaking fluid: No   Vaginal bleeding: No   Other:      Specific topics discussed at today's visit: fetal kick counts and labor precautions  Tests done today: none      Madeleine was seen today for high risk gestation.    Diagnoses and all orders for this visit:    38 weeks gestation of pregnancy    Maternal care due to low transverse uterine scar from previous  delivery    Excessive fetal growth affecting management of mother, antepartum, third trimester, fetus 1    Obesity affecting pregnancy in third trimester

## 2018-09-27 NOTE — H&P
Obstetric History and Physical    Chief Complaint   Patient presents with   • High Risk Gestation     32-year-old  with one previous delivery by  section of a 9 lbs. 8 oz. female infant.     2013  section at full-term delivering a 9 lbs. 8 oz. female infant named Linda.     She desires repeat  section and permanent surgical sterilization.  Signed sterilization papers 2018.     Having a boy named Waldemar.     Elevated 1 hour Glucola at 142.  Normal 3 hour GTT.  normal CBC.     Plan ultrasound at 32 weeks' gestation.     Ultrasound 2018 that showed a single intrauterine pregnancy with estimated fetal weight 5 lbs. 7 oz. which is 82nd percentile.  Amniotic fluid index 19.0 cm.  No abnormalities noted.     GBS swab negative 2018.     2018, size greater than dates, plan growth ultrasound in 2 weeks.     2018 ultrasound shows single intrauterine pregnancy in the cephalic position with estimated fetal weight 8 lbs. 6 oz. or 94th percentile.  DAINA normal at 12.4 cm.    Plan repeat low transverse  section and permanent surgical sterilization on .      Prenatal Information:  Prenatal Results     Initial Prenatal Labs     Test Value Reference Range Date Time    Hemoglobin 13.5 g/dL 12.0 - 15.5 g/dL 18 1023    Hematocrit 41.2 % 35.0 - 45.0 % 18 1023    Platelets 231 10*3/mm3 150 - 450 10*3/mm3 18 0806    Rubella IgG 67.6 IU/mL (H) 0.0 - 9.9 IU/mL 18 1023      Immune  Immune 18 1023    Hepatitis B SAg Negative  Negative 18 1023    Hepatitis C Ab Negative  Negative 18 1023    RPR Non-Reactive  Non-Reactive 18 1023    ABO O   18 1023    Rh Positive   18 1023    Antibody Screen Negative   18 1023    HIV Negative  Negative 18 1023    Urine Culture Mixed Farida Isolated   18 1023    Gonorrhea Negative  Negative 18  1023    Chlamydia Negative  Negative 02/23/18 1023    TSH 0.50 uIU/ml 0.46 - 4.68 uIU/ml 05/29/14 1041          2nd and 3rd Trimester     Test Value Reference Range Date Time    Hemoglobin (repeated) 12.4 g/dL 12.0 - 15.5 g/dL 06/18/18 0806    Hematocrit (repeated) 37.6 % 35.0 - 45.0 % 06/18/18 0806     mg/dL (H) 60 - 140 mg/dL 06/25/18 0909    Antibody Screen (repeated)        GTT Fasting        GTT 1 Hr        GTT 2 Hr        GTT 3 Hr        Group B Strep Negative  Negative 09/04/18 0956          Drug Screening     Test Value Reference Range Date Time    Amphetamine Screen Negative  Negative 02/23/18 1023    Barbiturate Screen Negative  Negative 02/23/18 1023    Benzodiazepine Screen Negative  Negative 02/23/18 1023    Methadone Screen Negative  Negative 02/23/18 1023    Phencyclidine Screen        Opiates Screen Negative  Negative 02/23/18 1023    THC Screen Negative  Negative 02/23/18 1023    Cocaine Screen Negative  Negative 02/23/18 1023    Propoxyphene Screen        Buprenorphine Screen        Methamphetamine Screen        Oxycodone Screen Negative  Negative 02/23/18 1023    Tryicyclic Antidepressants Screen              Other (Risk screening)     Test Value Reference Range Date Time    Varicella IgG        Parvovirus IgG        CMV IgG        Cystic Fibrosis        Hemoglobin electrophoresis        NIPT        MSAFP-4        AFP (for NTD only)                  External Prenatal Results     Pregnancy Outside Results - Transcribed From Office Records - See Scanned Records For Details     Test Value Date Time    Hgb 12.4 g/dL 06/18/18 0806    Hct 37.6 % 06/18/18 0806    ABO O  02/23/18 1023    Rh Positive  02/23/18 1023    Antibody Screen Negative  02/23/18 1023    Glucose Fasting GTT       Glucose Tolerance Test 1 hour       Glucose Tolerance Test 3 hour       Gonorrhea (discrete) Negative  02/23/18 1023    Chlamydia (discrete) Negative  02/23/18 1023    RPR Non-Reactive  02/23/18 1023    VDRL        Syphilis Antibody       Rubella 67.6 IU/mL (H) 18 1023      Immune  18 1023    HBsAg Negative  18 1023    Herpes Simplex Virus PCR       Herpes Simplex VIrus Culture       HIV Negative  18 1023    Hep C RNA Quant PCR       Hep C Antibody Negative  18 1023    AFP       Group B Strep Negative  18 0956    GBS Susceptibility to Clindamycin       GBS Susceptibility to Erythromycin       Fetal Fibronectin       Genetic Testing, Maternal Blood             Drug Screening     Test Value Date Time    Urine Drug Screen       Amphetamine Screen Negative  18 1023    Barbiturate Screen Negative  18 1023    Benzodiazepine Screen Negative  18 1023    Methadone Screen Negative  18 1023    Phencyclidine Screen       Opiates Screen Negative  18 1023    THC Screen Negative  18 1023    Cocaine Screen       Propoxyphene Screen       Buprenorphine Screen       Methamphetamine Screen       Oxycodone Screen Negative  18 1023    Tricyclic Antidepressants Screen                    Past OB History:     Obstetric History       T1      L1     SAB0   TAB0   Ectopic0   Molar0   Multiple0   Live Births1       # Outcome Date GA Lbr Cristi/2nd Weight Sex Delivery Anes PTL Lv   2 Current            1 Term 13 39w5d  4309 g (9 lb 8 oz) F CS-LTranv EPI, Spinal  ADRIANA      Complications: Gestational diabetes,Failure to progress in labor           ALLERGIES:     Allergies   Allergen Reactions   • Latex Rash     Pt states red, inflammation, and swelling at site         Home Medications:     Prior to Admission medications    Medication Sig Start Date End Date Taking? Authorizing Provider   hydrocortisone 1 % cream Apply  topically 2 (Two) Times a Day. 5/10/18  Yes Missy Ramirez MD   Prenatal Vit-Fe Fumarate-FA (MYNATAL PLUS) tablet Take 1 tablet by mouth Daily. 18  Yes Daria Modi APRN   clotrimazole-betamethasone (LOTRISONE) 1-0.05 % cream Apply   topically to the appropriate area as directed 2 (Two) Times a Day. 18   Tevin Johnson MD       Past Medical History: Past Medical History:   Diagnosis Date   • Acute pharyngitis    • Encounter for gynecological examination    • Encounter for  visit    • Encounter for routine adult health examination    • Headache    • History of gestational diabetes    • Ingrowing nail    • Maternal care due to low transverse uterine scar from previous  delivery 2018   • Migraine    • Nausea and vomiting    • Obesity    • Onychomycosis    • Pain in toe    • Presence of subcutaneous contraceptive implant    • Surgery follow-up       Past Surgical History Past Surgical History:   Procedure Laterality Date   •  SECTION  2013    Primary low transverse  section. Intrauterine pregnancy at 39 5/7 weeks. A1 gestational diabetes. Declines further induction of labor.   • NAIL BED REMOVAL/REVISION  2014    Excision of Nail and Nail Matrix, Permanent 45520 (1)         Family History: Family History   Problem Relation Age of Onset   • Diabetes Maternal Aunt    • Diabetes Maternal Grandmother    • Kidney failure Maternal Grandmother    • Diabetes Maternal Grandfather    • No Known Problems Father    • No Known Problems Mother    • Endometriosis Sister    • Hyperlipidemia Paternal Grandfather    • No Known Problems Paternal Grandmother    • No Known Problems Sister       Social History:  reports that she has quit smoking. She has never used smokeless tobacco.   reports that she does not drink alcohol.   reports that she does not use drugs.     Review of Systems     General ROS: Pertinent items are noted in HPI, all other systems reviewed and negative    Objective       Vital Signs Range for the last 24 hours  Temperature:     Temp Source:     BP: BP: (122)/(75) 122/75   Pulse:     Respirations:     SPO2:     O2 Amount (l/min):     O2 Devices     Weight: Weight:  [142 kg (314 lb)] 142 kg (314  lb)       OBGyn Exam  Physical Examination: General appearance - alert, well appearing, and in no distress and oriented to person, place, and time  Mental status - alert, oriented to person, place, and time, normal mood, behavior, speech, dress, motor activity, and thought processes  Neck - supple, no significant adenopathy  Chest - clear to auscultation, no wheezes, rales or rhonchi, symmetric air entry, no tachypnea, retractions or cyanosis  Heart - normal rate, regular rhythm, normal S1, S2, no murmurs, rubs, clicks or gallops  Abdomen - Gravid and nontender  Extremities - peripheral pulses normal, no pedal edema, no clubbing or cyanosis      The risks, benefits. and alternatives to  section were discussed.  The risks that were discussed included, but were not limited to, pain, infection, bleeding, hemorrhage; including need for transfusion to which she would have no objection; injury to the bowel, bladder, uterus, tubes, ovaries, or baby which could require further surgery or prolonged hospitalization.  Remote possibility of hysterectomy and/or salpingo-ophorectomy.  Remote possibility of death of baby and/or mother.  The patient understands that we'll have leg pumps on her legs to try and reduce the risk of clot formation her legs.  She understands that we will have early ambulation to also reduce the risk of blood clot formation and to reduce the risk of pneumonia.  She will be given antibiotics prior to her surgery to help decrease the risk for infection.  All questions were answered.    She was consented for PERMANENT SURGICAL STERILIZATION  We discussed the risk of no surgery to include pregnancy or undiagnosed disease process.  The risks that were discussed included, but were not limited to:  1. Bleeding with possible risk of blood transfusion. Blood products carry risk of HIV, infection, hepatitis, and transfusion reaction.  2. Infection requiring a antibiotic therapy.  3. Damage to internal  organs such as bowel, bladder, blood vessels, or a hole(fistula) bladder and vagina or rectum and vagina requiring further surgical repair.  4. Anesthetic risk to include death.  5. Risk of postsurgical depression or sexual dysfunction after sterilization.  6. Risk of failure of sterilization (Approximately 1/400) and subsequent risk of ectopic pregnancy.  7. Small risk for deep vein thrombosis were all explained.   8. The small risk for reoperation in the event of unanticipated bleeding or surgical injury was discussed.          All of her questions were answered fully. She left with a very clear understanding of the preoperative surgical indications and the nature of the surgery for which she is scheduled. She understands to be NPO after midnight.     Assessment:  1. Intrauterine pregnancy at 39 weeks on 2018 with previous  section desiring repeat  section and permanent surgical sterilization.    GBS status: Results in Past 30 Days  Result Component Current Result Ref Range Previous Result Ref Range   Group B Strep, DNA Negative (2018) Negative Not in Time Range        Plan:  1. Admit to labor and delivery on 2018 at 39 weeks gestation for repeat  section and permanent surgical sterilization.  2. Plan of care has been reviewed with staff and patient.  3. Risks, benefits of treatment plan have been discussed.  4. All questions have been answered.      Tevin Johnson MD  2018  9:23 AM

## 2018-10-04 ENCOUNTER — HOSPITAL ENCOUNTER (INPATIENT)
Facility: HOSPITAL | Age: 33
LOS: 2 days | Discharge: HOME | End: 2018-10-06
Attending: OBSTETRICS & GYNECOLOGY | Admitting: OBSTETRICS & GYNECOLOGY

## 2018-10-04 ENCOUNTER — ANESTHESIA EVENT (OUTPATIENT)
Dept: LABOR AND DELIVERY | Facility: HOSPITAL | Age: 33
End: 2018-10-04

## 2018-10-04 ENCOUNTER — ANESTHESIA (OUTPATIENT)
Dept: LABOR AND DELIVERY | Facility: HOSPITAL | Age: 33
End: 2018-10-04

## 2018-10-04 DIAGNOSIS — O36.63X1: ICD-10-CM

## 2018-10-04 DIAGNOSIS — O99.213 OBESITY AFFECTING PREGNANCY IN THIRD TRIMESTER: ICD-10-CM

## 2018-10-04 DIAGNOSIS — O34.211 MATERNAL CARE DUE TO LOW TRANSVERSE UTERINE SCAR FROM PREVIOUS CESAREAN DELIVERY: ICD-10-CM

## 2018-10-04 DIAGNOSIS — Z3A.39 39 WEEKS GESTATION OF PREGNANCY: ICD-10-CM

## 2018-10-04 LAB
ABO GROUP BLD: NORMAL
AMPHET+METHAMPHET UR QL: NEGATIVE
BARBITURATES UR QL SCN: NEGATIVE
BENZODIAZ UR QL SCN: NEGATIVE
BLD GP AB SCN SERPL QL: NEGATIVE
CANNABINOIDS SERPL QL: NEGATIVE
COCAINE UR QL: NEGATIVE
DEPRECATED RDW RBC AUTO: 44 FL (ref 36.4–46.3)
ERYTHROCYTE [DISTWIDTH] IN BLOOD BY AUTOMATED COUNT: 14.5 % (ref 11.5–14.5)
HCT VFR BLD AUTO: 34.9 % (ref 35–45)
HGB BLD-MCNC: 11.3 G/DL (ref 12–15.5)
HOLD SPECIMEN: NORMAL
Lab: NORMAL
MCH RBC QN AUTO: 27.1 PG (ref 26.5–34)
MCHC RBC AUTO-ENTMCNC: 32.4 G/DL (ref 31.4–36)
MCV RBC AUTO: 83.7 FL (ref 80–98)
METHADONE UR QL SCN: NEGATIVE
OPIATES UR QL: NEGATIVE
OXYCODONE UR QL SCN: NEGATIVE
PLATELET # BLD AUTO: 237 10*3/MM3 (ref 150–450)
PMV BLD AUTO: 11.2 FL (ref 8–12)
RBC # BLD AUTO: 4.17 10*6/MM3 (ref 3.77–5.16)
RH BLD: POSITIVE
T&S EXPIRATION DATE: NORMAL
WBC NRBC COR # BLD: 7.07 10*3/MM3 (ref 3.2–9.8)

## 2018-10-04 PROCEDURE — 25010000002 KETOROLAC TROMETHAMINE PER 15 MG: Performed by: OBSTETRICS & GYNECOLOGY

## 2018-10-04 PROCEDURE — 58611 LIGATE OVIDUCT(S) ADD-ON: CPT | Performed by: OBSTETRICS & GYNECOLOGY

## 2018-10-04 PROCEDURE — 80307 DRUG TEST PRSMV CHEM ANLYZR: CPT | Performed by: OBSTETRICS & GYNECOLOGY

## 2018-10-04 PROCEDURE — 86850 RBC ANTIBODY SCREEN: CPT | Performed by: OBSTETRICS & GYNECOLOGY

## 2018-10-04 PROCEDURE — 59515 CESAREAN DELIVERY: CPT | Performed by: OBSTETRICS & GYNECOLOGY

## 2018-10-04 PROCEDURE — 86900 BLOOD TYPING SEROLOGIC ABO: CPT | Performed by: OBSTETRICS & GYNECOLOGY

## 2018-10-04 PROCEDURE — 86901 BLOOD TYPING SEROLOGIC RH(D): CPT | Performed by: OBSTETRICS & GYNECOLOGY

## 2018-10-04 PROCEDURE — 25010000002 METHOCARBAMOL 1000 MG/10ML SOLUTION 10 ML VIAL: Performed by: OBSTETRICS & GYNECOLOGY

## 2018-10-04 PROCEDURE — 25010000002 FENTANYL CITRATE (PF) 100 MCG/2ML SOLUTION: Performed by: NURSE ANESTHETIST, CERTIFIED REGISTERED

## 2018-10-04 PROCEDURE — 51703 INSERT BLADDER CATH COMPLEX: CPT

## 2018-10-04 PROCEDURE — 85027 COMPLETE CBC AUTOMATED: CPT | Performed by: OBSTETRICS & GYNECOLOGY

## 2018-10-04 PROCEDURE — 25010000002 ONDANSETRON PER 1 MG: Performed by: NURSE ANESTHETIST, CERTIFIED REGISTERED

## 2018-10-04 PROCEDURE — 25010000002 PHENYLEPHRINE PER 1 ML: Performed by: NURSE ANESTHETIST, CERTIFIED REGISTERED

## 2018-10-04 PROCEDURE — 25010000002 BUTORPHANOL PER 1 MG: Performed by: OBSTETRICS & GYNECOLOGY

## 2018-10-04 PROCEDURE — 25010000002 MORPHINE PER 10 MG: Performed by: NURSE ANESTHETIST, CERTIFIED REGISTERED

## 2018-10-04 PROCEDURE — 0UL70CZ OCCLUSION OF BILATERAL FALLOPIAN TUBES WITH EXTRALUMINAL DEVICE, OPEN APPROACH: ICD-10-PCS | Performed by: OBSTETRICS & GYNECOLOGY

## 2018-10-04 PROCEDURE — 25010000002 KETOROLAC TROMETHAMINE PER 15 MG: Performed by: NURSE ANESTHETIST, CERTIFIED REGISTERED

## 2018-10-04 PROCEDURE — 94799 UNLISTED PULMONARY SVC/PX: CPT

## 2018-10-04 PROCEDURE — 25010000002 CEFAZOLIN PER 500 MG: Performed by: OBSTETRICS & GYNECOLOGY

## 2018-10-04 DEVICE — CLIP FALLOP FILSHIE TI PR STRL BX/20: Type: IMPLANTABLE DEVICE | Site: FALLOPIAN TUBE | Status: FUNCTIONAL

## 2018-10-04 RX ORDER — KETOROLAC TROMETHAMINE 30 MG/ML
INJECTION, SOLUTION INTRAMUSCULAR; INTRAVENOUS AS NEEDED
Status: DISCONTINUED | OUTPATIENT
Start: 2018-10-04 | End: 2018-10-04 | Stop reason: SURG

## 2018-10-04 RX ORDER — SODIUM CHLORIDE, SODIUM LACTATE, POTASSIUM CHLORIDE, CALCIUM CHLORIDE 600; 310; 30; 20 MG/100ML; MG/100ML; MG/100ML; MG/100ML
INJECTION, SOLUTION INTRAVENOUS CONTINUOUS PRN
Status: DISCONTINUED | OUTPATIENT
Start: 2018-10-04 | End: 2018-10-04 | Stop reason: SURG

## 2018-10-04 RX ORDER — MISOPROSTOL 200 UG/1
600 TABLET ORAL ONCE
Status: DISCONTINUED | OUTPATIENT
Start: 2018-10-04 | End: 2018-10-06 | Stop reason: HOSPADM

## 2018-10-04 RX ORDER — MISOPROSTOL 200 UG/1
800 TABLET ORAL AS NEEDED
Status: DISCONTINUED | OUTPATIENT
Start: 2018-10-04 | End: 2018-10-04 | Stop reason: HOSPADM

## 2018-10-04 RX ORDER — BUPIVACAINE HCL/0.9 % NACL/PF 0.1 %
2 PLASTIC BAG, INJECTION (ML) EPIDURAL EVERY 8 HOURS
Status: DISCONTINUED | OUTPATIENT
Start: 2018-10-04 | End: 2018-10-06

## 2018-10-04 RX ORDER — ONDANSETRON 2 MG/ML
4 INJECTION INTRAMUSCULAR; INTRAVENOUS EVERY 6 HOURS PRN
Status: DISCONTINUED | OUTPATIENT
Start: 2018-10-04 | End: 2018-10-06

## 2018-10-04 RX ORDER — SIMETHICONE 80 MG
80 TABLET,CHEWABLE ORAL 4 TIMES DAILY PRN
Status: DISCONTINUED | OUTPATIENT
Start: 2018-10-04 | End: 2018-10-06 | Stop reason: HOSPADM

## 2018-10-04 RX ORDER — OXYTOCIN/RINGER'S LACTATE 20/1000 ML
PLASTIC BAG, INJECTION (ML) INTRAVENOUS AS NEEDED
Status: DISCONTINUED | OUTPATIENT
Start: 2018-10-04 | End: 2018-10-04 | Stop reason: SURG

## 2018-10-04 RX ORDER — DIPHENHYDRAMINE HCL 25 MG
25 CAPSULE ORAL EVERY 4 HOURS PRN
Status: DISCONTINUED | OUTPATIENT
Start: 2018-10-04 | End: 2018-10-04 | Stop reason: SDUPTHER

## 2018-10-04 RX ORDER — NALOXONE HCL 0.4 MG/ML
0.2 VIAL (ML) INJECTION
Status: DISCONTINUED | OUTPATIENT
Start: 2018-10-04 | End: 2018-10-06

## 2018-10-04 RX ORDER — IBUPROFEN 800 MG/1
800 TABLET ORAL EVERY 8 HOURS PRN
Qty: 60 TABLET | Refills: 12 | Status: SHIPPED | OUTPATIENT
Start: 2018-10-04 | End: 2022-03-18

## 2018-10-04 RX ORDER — FENTANYL CITRATE 50 UG/ML
INJECTION, SOLUTION INTRAMUSCULAR; INTRAVENOUS AS NEEDED
Status: DISCONTINUED | OUTPATIENT
Start: 2018-10-04 | End: 2018-10-04 | Stop reason: SURG

## 2018-10-04 RX ORDER — DIPHENHYDRAMINE HYDROCHLORIDE 50 MG/ML
25 INJECTION INTRAMUSCULAR; INTRAVENOUS EVERY 4 HOURS PRN
Status: DISCONTINUED | OUTPATIENT
Start: 2018-10-04 | End: 2018-10-04 | Stop reason: CLARIF

## 2018-10-04 RX ORDER — LANOLIN 100 %
OINTMENT (GRAM) TOPICAL
Status: DISCONTINUED | OUTPATIENT
Start: 2018-10-04 | End: 2018-10-06 | Stop reason: HOSPADM

## 2018-10-04 RX ORDER — CALCIUM CARBONATE 200(500)MG
1 TABLET,CHEWABLE ORAL EVERY 6 HOURS PRN
Status: DISCONTINUED | OUTPATIENT
Start: 2018-10-04 | End: 2018-10-06 | Stop reason: HOSPADM

## 2018-10-04 RX ORDER — ONDANSETRON 2 MG/ML
INJECTION INTRAMUSCULAR; INTRAVENOUS AS NEEDED
Status: DISCONTINUED | OUTPATIENT
Start: 2018-10-04 | End: 2018-10-04 | Stop reason: SURG

## 2018-10-04 RX ORDER — METHYLERGONOVINE MALEATE 0.2 MG/ML
200 INJECTION INTRAVENOUS ONCE AS NEEDED
Status: DISCONTINUED | OUTPATIENT
Start: 2018-10-04 | End: 2018-10-04 | Stop reason: HOSPADM

## 2018-10-04 RX ORDER — SODIUM CHLORIDE, SODIUM LACTATE, POTASSIUM CHLORIDE, CALCIUM CHLORIDE 600; 310; 30; 20 MG/100ML; MG/100ML; MG/100ML; MG/100ML
INJECTION, SOLUTION INTRAVENOUS
Status: COMPLETED
Start: 2018-10-04 | End: 2018-10-04

## 2018-10-04 RX ORDER — BISACODYL 10 MG
10 SUPPOSITORY, RECTAL RECTAL DAILY PRN
Status: DISCONTINUED | OUTPATIENT
Start: 2018-10-04 | End: 2018-10-06 | Stop reason: HOSPADM

## 2018-10-04 RX ORDER — ACETAMINOPHEN 325 MG/1
650 TABLET ORAL EVERY 4 HOURS PRN
Status: DISCONTINUED | OUTPATIENT
Start: 2018-10-04 | End: 2018-10-06 | Stop reason: HOSPADM

## 2018-10-04 RX ORDER — ONDANSETRON 4 MG/1
4 TABLET, FILM COATED ORAL EVERY 6 HOURS PRN
Status: DISCONTINUED | OUTPATIENT
Start: 2018-10-04 | End: 2018-10-06 | Stop reason: HOSPADM

## 2018-10-04 RX ORDER — EPHEDRINE SULFATE 50 MG/ML
INJECTION, SOLUTION INTRAVENOUS AS NEEDED
Status: DISCONTINUED | OUTPATIENT
Start: 2018-10-04 | End: 2018-10-04 | Stop reason: SURG

## 2018-10-04 RX ORDER — LIDOCAINE HYDROCHLORIDE 10 MG/ML
5 INJECTION, SOLUTION EPIDURAL; INFILTRATION; INTRACAUDAL; PERINEURAL AS NEEDED
Status: DISCONTINUED | OUTPATIENT
Start: 2018-10-04 | End: 2018-10-04 | Stop reason: HOSPADM

## 2018-10-04 RX ORDER — IBUPROFEN 600 MG/1
600 TABLET ORAL EVERY 8 HOURS PRN
Status: DISCONTINUED | OUTPATIENT
Start: 2018-10-04 | End: 2018-10-06 | Stop reason: HOSPADM

## 2018-10-04 RX ORDER — ONDANSETRON 2 MG/ML
4 INJECTION INTRAMUSCULAR; INTRAVENOUS ONCE AS NEEDED
Status: ACTIVE | OUTPATIENT
Start: 2018-10-04 | End: 2018-10-05

## 2018-10-04 RX ORDER — OXYTOCIN/RINGER'S LACTATE 20/1000 ML
PLASTIC BAG, INJECTION (ML) INTRAVENOUS
Status: COMPLETED
Start: 2018-10-04 | End: 2018-10-04

## 2018-10-04 RX ORDER — MISOPROSTOL 200 UG/1
TABLET ORAL
Status: DISPENSED
Start: 2018-10-04 | End: 2018-10-04

## 2018-10-04 RX ORDER — DIPHENHYDRAMINE HCL 25 MG
25 CAPSULE ORAL EVERY 4 HOURS PRN
Status: DISCONTINUED | OUTPATIENT
Start: 2018-10-04 | End: 2018-10-06 | Stop reason: HOSPADM

## 2018-10-04 RX ORDER — HYDROXYZINE HYDROCHLORIDE 25 MG/1
50 TABLET, FILM COATED ORAL EVERY 6 HOURS PRN
Status: DISCONTINUED | OUTPATIENT
Start: 2018-10-04 | End: 2018-10-06 | Stop reason: HOSPADM

## 2018-10-04 RX ORDER — MISOPROSTOL 200 UG/1
400 TABLET ORAL ONCE
Status: COMPLETED | OUTPATIENT
Start: 2018-10-04 | End: 2018-10-04

## 2018-10-04 RX ORDER — SODIUM CHLORIDE 0.9 % (FLUSH) 0.9 %
1-10 SYRINGE (ML) INJECTION AS NEEDED
Status: DISCONTINUED | OUTPATIENT
Start: 2018-10-04 | End: 2018-10-04 | Stop reason: HOSPADM

## 2018-10-04 RX ORDER — BISACODYL 5 MG/1
10 TABLET, DELAYED RELEASE ORAL DAILY PRN
Status: DISCONTINUED | OUTPATIENT
Start: 2018-10-04 | End: 2018-10-06 | Stop reason: HOSPADM

## 2018-10-04 RX ORDER — HYDROCODONE BITARTRATE AND ACETAMINOPHEN 5; 325 MG/1; MG/1
1 TABLET ORAL EVERY 4 HOURS PRN
Status: DISCONTINUED | OUTPATIENT
Start: 2018-10-04 | End: 2018-10-06 | Stop reason: HOSPADM

## 2018-10-04 RX ORDER — CELECOXIB 200 MG/1
400 CAPSULE ORAL ONCE
Status: COMPLETED | OUTPATIENT
Start: 2018-10-04 | End: 2018-10-04

## 2018-10-04 RX ORDER — NALOXONE HCL 0.4 MG/ML
0.1 VIAL (ML) INJECTION
Status: DISCONTINUED | OUTPATIENT
Start: 2018-10-04 | End: 2018-10-06

## 2018-10-04 RX ORDER — METOCLOPRAMIDE 10 MG/1
10 TABLET ORAL ONCE
Status: DISCONTINUED | OUTPATIENT
Start: 2018-10-04 | End: 2018-10-06 | Stop reason: HOSPADM

## 2018-10-04 RX ORDER — HYDROMORPHONE HCL 110MG/55ML
0.5 PATIENT CONTROLLED ANALGESIA SYRINGE INTRAVENOUS
Status: DISCONTINUED | OUTPATIENT
Start: 2018-10-04 | End: 2018-10-06

## 2018-10-04 RX ORDER — PROMETHAZINE HYDROCHLORIDE 25 MG/1
25 TABLET ORAL EVERY 6 HOURS PRN
Status: DISCONTINUED | OUTPATIENT
Start: 2018-10-04 | End: 2018-10-06 | Stop reason: HOSPADM

## 2018-10-04 RX ORDER — BUPIVACAINE HYDROCHLORIDE 7.5 MG/ML
INJECTION, SOLUTION EPIDURAL; RETROBULBAR AS NEEDED
Status: DISCONTINUED | OUTPATIENT
Start: 2018-10-04 | End: 2018-10-04 | Stop reason: SURG

## 2018-10-04 RX ORDER — SODIUM CHLORIDE, SODIUM LACTATE, POTASSIUM CHLORIDE, CALCIUM CHLORIDE 600; 310; 30; 20 MG/100ML; MG/100ML; MG/100ML; MG/100ML
125 INJECTION, SOLUTION INTRAVENOUS CONTINUOUS
Status: DISCONTINUED | OUTPATIENT
Start: 2018-10-04 | End: 2018-10-06

## 2018-10-04 RX ORDER — HYDROCODONE BITARTRATE AND ACETAMINOPHEN 5; 325 MG/1; MG/1
1 TABLET ORAL EVERY 4 HOURS PRN
Qty: 40 TABLET | Refills: 0 | Status: SHIPPED | OUTPATIENT
Start: 2018-10-04 | End: 2019-02-21

## 2018-10-04 RX ORDER — PROMETHAZINE HYDROCHLORIDE 25 MG/ML
12.5 INJECTION, SOLUTION INTRAMUSCULAR; INTRAVENOUS EVERY 6 HOURS PRN
Status: DISCONTINUED | OUTPATIENT
Start: 2018-10-04 | End: 2018-10-06 | Stop reason: HOSPADM

## 2018-10-04 RX ORDER — TRISODIUM CITRATE DIHYDRATE AND CITRIC ACID MONOHYDRATE 500; 334 MG/5ML; MG/5ML
30 SOLUTION ORAL ONCE
Status: COMPLETED | OUTPATIENT
Start: 2018-10-04 | End: 2018-10-04

## 2018-10-04 RX ORDER — CARBOPROST TROMETHAMINE 250 UG/ML
250 INJECTION, SOLUTION INTRAMUSCULAR AS NEEDED
Status: DISCONTINUED | OUTPATIENT
Start: 2018-10-04 | End: 2018-10-04 | Stop reason: HOSPADM

## 2018-10-04 RX ORDER — PROMETHAZINE HYDROCHLORIDE 12.5 MG/1
12.5 SUPPOSITORY RECTAL EVERY 6 HOURS PRN
Status: DISCONTINUED | OUTPATIENT
Start: 2018-10-04 | End: 2018-10-06 | Stop reason: HOSPADM

## 2018-10-04 RX ORDER — BUTORPHANOL TARTRATE 1 MG/ML
2 INJECTION, SOLUTION INTRAMUSCULAR; INTRAVENOUS
Status: DISCONTINUED | OUTPATIENT
Start: 2018-10-04 | End: 2018-10-06

## 2018-10-04 RX ORDER — MORPHINE SULFATE 1 MG/ML
INJECTION, SOLUTION EPIDURAL; INTRATHECAL; INTRAVENOUS AS NEEDED
Status: DISCONTINUED | OUTPATIENT
Start: 2018-10-04 | End: 2018-10-04 | Stop reason: SURG

## 2018-10-04 RX ORDER — OXYTOCIN/RINGER'S LACTATE 20/1000 ML
125 PLASTIC BAG, INJECTION (ML) INTRAVENOUS ONCE
Status: COMPLETED | OUTPATIENT
Start: 2018-10-04 | End: 2018-10-04

## 2018-10-04 RX ORDER — KETOROLAC TROMETHAMINE 30 MG/ML
30 INJECTION, SOLUTION INTRAMUSCULAR; INTRAVENOUS EVERY 6 HOURS
Status: DISPENSED | OUTPATIENT
Start: 2018-10-04 | End: 2018-10-05

## 2018-10-04 RX ORDER — SENNA AND DOCUSATE SODIUM 50; 8.6 MG/1; MG/1
1 TABLET, FILM COATED ORAL 2 TIMES DAILY PRN
Status: DISCONTINUED | OUTPATIENT
Start: 2018-10-04 | End: 2018-10-06 | Stop reason: HOSPADM

## 2018-10-04 RX ORDER — ONDANSETRON 4 MG/1
4 TABLET, ORALLY DISINTEGRATING ORAL EVERY 6 HOURS PRN
Status: DISCONTINUED | OUTPATIENT
Start: 2018-10-04 | End: 2018-10-06 | Stop reason: HOSPADM

## 2018-10-04 RX ORDER — ZOLPIDEM TARTRATE 5 MG/1
5 TABLET ORAL NIGHTLY PRN
Status: DISCONTINUED | OUTPATIENT
Start: 2018-10-04 | End: 2018-10-06 | Stop reason: HOSPADM

## 2018-10-04 RX ORDER — DOCUSATE SODIUM 100 MG/1
100 CAPSULE, LIQUID FILLED ORAL 2 TIMES DAILY
Status: DISCONTINUED | OUTPATIENT
Start: 2018-10-04 | End: 2018-10-06 | Stop reason: HOSPADM

## 2018-10-04 RX ORDER — CEFAZOLIN SODIUM IN 0.9 % NACL 3 G/100 ML
3 INTRAVENOUS SOLUTION, PIGGYBACK (ML) INTRAVENOUS ONCE
Status: COMPLETED | OUTPATIENT
Start: 2018-10-04 | End: 2018-10-04

## 2018-10-04 RX ADMIN — SODIUM CITRATE AND CITRIC ACID MONOHYDRATE 30 ML: 500; 334 SOLUTION ORAL at 06:58

## 2018-10-04 RX ADMIN — CEFAZOLIN 3 G: 1 INJECTION, POWDER, FOR SOLUTION INTRAMUSCULAR; INTRAVENOUS; PARENTERAL at 07:35

## 2018-10-04 RX ADMIN — Medication 100 ML: at 08:08

## 2018-10-04 RX ADMIN — Medication 100 ML: at 08:30

## 2018-10-04 RX ADMIN — CEFAZOLIN SODIUM 2 G: 10 INJECTION, POWDER, FOR SOLUTION INTRAVENOUS at 21:54

## 2018-10-04 RX ADMIN — PHENYLEPHRINE HYDROCHLORIDE 100 MCG: 10 INJECTION INTRAVENOUS at 07:53

## 2018-10-04 RX ADMIN — FENTANYL CITRATE 50 MCG: 50 INJECTION, SOLUTION INTRAMUSCULAR; INTRAVENOUS at 08:20

## 2018-10-04 RX ADMIN — PHENYLEPHRINE HYDROCHLORIDE 100 MCG: 10 INJECTION INTRAVENOUS at 07:50

## 2018-10-04 RX ADMIN — SODIUM CHLORIDE, POTASSIUM CHLORIDE, SODIUM LACTATE AND CALCIUM CHLORIDE 125 ML/HR: 600; 310; 30; 20 INJECTION, SOLUTION INTRAVENOUS at 09:41

## 2018-10-04 RX ADMIN — SODIUM CHLORIDE, POTASSIUM CHLORIDE, SODIUM LACTATE AND CALCIUM CHLORIDE: 600; 310; 30; 20 INJECTION, SOLUTION INTRAVENOUS at 07:10

## 2018-10-04 RX ADMIN — CEFAZOLIN SODIUM 2 G: 10 INJECTION, POWDER, FOR SOLUTION INTRAVENOUS at 17:13

## 2018-10-04 RX ADMIN — KETOROLAC TROMETHAMINE 60 MG: 30 INJECTION, SOLUTION INTRAMUSCULAR; INTRAVENOUS at 08:35

## 2018-10-04 RX ADMIN — ONDANSETRON 4 MG: 2 INJECTION INTRAMUSCULAR; INTRAVENOUS at 07:05

## 2018-10-04 RX ADMIN — Medication 100 ML: at 08:26

## 2018-10-04 RX ADMIN — OXYTOCIN 125 ML/HR: 10 INJECTION, SOLUTION INTRAMUSCULAR; INTRAVENOUS at 09:22

## 2018-10-04 RX ADMIN — METHOCARBAMOL 1000 MG: 100 INJECTION INTRAMUSCULAR; INTRAVENOUS at 09:42

## 2018-10-04 RX ADMIN — PHENYLEPHRINE HYDROCHLORIDE 100 MCG: 10 INJECTION INTRAVENOUS at 08:20

## 2018-10-04 RX ADMIN — Medication 100 ML: at 08:13

## 2018-10-04 RX ADMIN — BUPIVACAINE HYDROCHLORIDE 1.6 ML: 7.5 INJECTION, SOLUTION EPIDURAL; RETROBULBAR at 07:41

## 2018-10-04 RX ADMIN — Medication 100 ML: at 08:35

## 2018-10-04 RX ADMIN — Medication 125 ML/HR: at 09:22

## 2018-10-04 RX ADMIN — Medication 100 ML: at 08:23

## 2018-10-04 RX ADMIN — EPHEDRINE SULFATE 10 MG: 50 INJECTION INTRAVENOUS at 07:55

## 2018-10-04 RX ADMIN — SODIUM CHLORIDE, SODIUM LACTATE, POTASSIUM CHLORIDE, CALCIUM CHLORIDE 125 ML/HR: 600; 310; 30; 20 INJECTION, SOLUTION INTRAVENOUS at 09:41

## 2018-10-04 RX ADMIN — Medication 200 ML: at 08:40

## 2018-10-04 RX ADMIN — SODIUM CHLORIDE, POTASSIUM CHLORIDE, SODIUM LACTATE AND CALCIUM CHLORIDE 1000 ML: 600; 310; 30; 20 INJECTION, SOLUTION INTRAVENOUS at 06:59

## 2018-10-04 RX ADMIN — PHENYLEPHRINE HYDROCHLORIDE 100 MCG: 10 INJECTION INTRAVENOUS at 08:14

## 2018-10-04 RX ADMIN — KETOROLAC TROMETHAMINE 30 MG: 30 INJECTION, SOLUTION INTRAMUSCULAR; INTRAVENOUS at 20:17

## 2018-10-04 RX ADMIN — PHENYLEPHRINE HYDROCHLORIDE 100 MCG: 10 INJECTION INTRAVENOUS at 07:45

## 2018-10-04 RX ADMIN — BUTORPHANOL TARTRATE 2 MG: 1 INJECTION, SOLUTION INTRAMUSCULAR; INTRAVENOUS at 17:11

## 2018-10-04 RX ADMIN — Medication 100 ML: at 08:19

## 2018-10-04 RX ADMIN — CELECOXIB 400 MG: 200 CAPSULE ORAL at 08:59

## 2018-10-04 RX ADMIN — METHOCARBAMOL 1000 MG: 100 INJECTION INTRAMUSCULAR; INTRAVENOUS at 18:25

## 2018-10-04 RX ADMIN — Medication 0.2 MG: at 07:41

## 2018-10-04 RX ADMIN — MISOPROSTOL 400 MCG: 200 TABLET ORAL at 08:24

## 2018-10-04 RX ADMIN — DOCUSATE SODIUM 100 MG: 100 CAPSULE, LIQUID FILLED ORAL at 20:18

## 2018-10-04 NOTE — ANESTHESIA POSTPROCEDURE EVALUATION
Patient: Madeleine Brooks    Procedure Summary     Date:  10/04/18 Room / Location:  [facility] LABOR DELIVERY  [facility] LABOR DELIVERY    Anesthesia Start:  710 Anesthesia Stop:  848    Procedure:   SECTION REPEAT WITH TUBAL (N/A Abdomen) Diagnosis:      Surgeon:  Tevin Johnson MD Provider:  Mauro Patel CRNA    Anesthesia Type:  spinal ASA Status:  3          Anesthesia Type: spinal  Last vitals  BP       Temp       Pulse       Resp         SpO2         Post Anesthesia Care and Evaluation    Patient location during evaluation: bedside  Patient participation: complete - patient participated  Level of consciousness: awake and alert  Pain score: 0  Pain management: adequate  Airway patency: patent  Anesthetic complications: No anesthetic complications  PONV Status: none  Cardiovascular status: acceptable  Respiratory status: acceptable  Hydration status: acceptable

## 2018-10-04 NOTE — ANESTHESIA PREPROCEDURE EVALUATION
Anesthesia Evaluation     Patient summary reviewed and Nursing notes reviewed   NPO Solid Status: > 8 hours  NPO Liquid Status: > 8 hours           Airway   Mallampati: III  TM distance: >3 FB  Neck ROM: full  Possible difficult intubation  Dental - normal exam     Pulmonary - normal exam   Cardiovascular - normal exam        Neuro/Psych  (+) headaches,     GI/Hepatic/Renal/Endo    (+) obesity, morbid obesity,      Musculoskeletal     Abdominal   (+) obese,    Substance History      OB/GYN    (+) Pregnant,         Other                        Anesthesia Plan    ASA 3     spinal     Anesthetic plan, all risks, benefits, and alternatives have been provided, discussed and informed consent has been obtained with: patient.    Plan discussed with CRNA.

## 2018-10-04 NOTE — INTERVAL H&P NOTE
H&P reviewed. The patient was examined and there are no changes to the H&P.   Blood type O positive.  Preoperative hemoglobin 11.3 and hematocrit 34.9 and platelets 237,000 MCG a 3.7.  Plan repeat low transverse  section and permanent surgical sterilization.  Having a boy named Waldemar.  Answered all patient and family questions.  Prayed with patient.

## 2018-10-04 NOTE — PLAN OF CARE
Problem: Patient Care Overview  Goal: Plan of Care Review  Outcome: Ongoing (interventions implemented as appropriate)   10/04/18 1010   Coping/Psychosocial   Plan of Care Reviewed With patient;spouse     Goal: Individualization and Mutuality  Outcome: Ongoing (interventions implemented as appropriate)    Goal: Discharge Needs Assessment  Outcome: Ongoing (interventions implemented as appropriate)    Goal: Interprofessional Rounds/Family Conf  Outcome: Ongoing (interventions implemented as appropriate)      Problem: Anesthesia/Analgesia, Neuraxial (Obstetrics)  Goal: Signs and Symptoms of Listed Potential Problems Will be Absent, Minimized or Managed (Anesthesia/Analgesia, Neuraxial)  Outcome: Ongoing (interventions implemented as appropriate)      Problem: Fall Risk,  (Adult,Obstetrics,Pediatric)  Goal: Identify Related Risk Factors and Signs and Symptoms  Outcome: Ongoing (interventions implemented as appropriate)    Goal: Absence of Maternal Fall  Outcome: Ongoing (interventions implemented as appropriate)    Goal: Absence of  Fall/Drop  Outcome: Ongoing (interventions implemented as appropriate)      Problem: Postpartum ( Delivery) (Adult,Obstetrics,Pediatric)  Goal: Signs and Symptoms of Listed Potential Problems Will be Absent, Minimized or Managed (Postpartum)  Outcome: Ongoing (interventions implemented as appropriate)    Goal: Anesthesia/Sedation Recovery  Outcome: Ongoing (interventions implemented as appropriate)      Problem: Breastfeeding (Adult,Obstetrics,Pediatric)  Goal: Signs and Symptoms of Listed Potential Problems Will be Absent, Minimized or Managed (Breastfeeding)  Outcome: Ongoing (interventions implemented as appropriate)

## 2018-10-04 NOTE — ANESTHESIA PROCEDURE NOTES
Spinal Block      Patient location during procedure: OB  Indication:at surgeon's request  Performed By  CRNA: GE RODRIGUEZ  Preanesthetic Checklist  Completed: patient identified, site marked, surgical consent, pre-op evaluation, timeout performed, IV checked, risks and benefits discussed and monitors and equipment checked  Spinal Block Prep:  Patient Position:sitting  Sterile Tech:gloves, cap, mask and sterile barriers  Prep:Betadine  Patient Monitoring:blood pressure monitoring, continuous pulse oximetry and EKG  Spinal Block Procedure  Approach:midline  Guidance:palpation technique  Location:L3-L4  Needle Type:Pencan  Needle Gauge:22 G  Placement of Spinal needle event:cerebrospinal fluid aspirated  Paresthesia: no  Fluid Appearance:clear  Post Assessment  Patient Tolerance:patient tolerated the procedure well with no apparent complications  Complications no

## 2018-10-04 NOTE — L&D DELIVERY NOTE
Section Procedure Note    Madeleine Brooks    10/4/2018     Indications: 33yo [id number] at 39w0d gestation who was admitted for a scheduled repeat low transverse  delivery with tubal ligation.     Pre-operative Diagnosis: 33yo [id number] at 39w0d gestation who was admitted for a scheduled repeat low transverse  delivery with tubal ligation.    Post-operative Diagnosis: 33yo [id number] at 39w0d gestation who was admitted for a scheduled repeat low transverse  delivery with tubal ligation.    PROCEDURES PERFORMED:   SECTION REPEAT WITH TUBAL    SURGEON: Tevin Johnson MD, FACOG    RESIDENT SURGEON: Yefri Hoover DO, PGY-3    STAFF:   Circulator: Amirah Sotelo RN  Scrub Person: Deonna Wood & LENORA Nurse: Amanda Quijano RN  Assistant: Lizzie Varghese CSA    ANESTHESIA: Spinal    ANESTHESIA STAFF:  CRNA: Mauro Patel CRNA  Student Nurse Anesthetist: Desiree Allred SRNA    Findings:viable male infant, Waldemar, weight 9 lb. 10 oz. Apgars 9/9. Normal appearing uterus, tubes and ovaries.     Birth Information  YOB: 2018   Time of birth: 8:06 AM   Delivering clinician:     Sex: male   Delivery type: , Low Transverse   Breech type (if applicable):     Observed anomalies/comments:         Estimated Blood Loss:  1000           Drains: See                 Specimens: None               Complications:  None; patient tolerated the procedure well.           Disposition: Mother Baby Unit           Condition: stable    Procedure Details   After informed consent was obtained by discussing the risks, benefits, complications, treatment options, alternatives,  and expected outcomes of the  and tubal sterilization and after the patient agreed with the proposed plan, the operative consent was signed and in order, the patient was taken to the Operating Room, identified as Madeleine Brooks and the procedure verified as   Delivery and permanent surgical sterilization.    After induction of anesthesia, the patient was prepped,and her panniculus was elevated with the Traxi retractor and she was reprepped and draped in the usual sterile manner.  A Time Out was performed.      A Pfannenstiel incision was then made and carried sharply down through the subcutaneous tissue to the fascia. Fascial incision was made and extended transversely. The fascia was  from the underlying rectus tissue superiorly and inferiorly.  The rectus muscles were  in the midline. The peritoneum was entered bluntly.       A low transverse uterine incision was made. A vacuum was used to assist delivery of the fetal head with no pop offs. Delivered from vertex presentation was a viable male infant, Waldemar. See above for weight and apgars.      After the umbilical cord was clamped and cut, cord blood was obtained for evaluation. The placenta was removed intact and appeared normal. The uterus, tubes and ovaries appeared normal. The uterine incision was closed with a running locked sutures of #1 Chromic. Hemostasis was observed.     The right fallopian tube was identified and traced out to it's fimbriated end.  A Filshie clip was placed approximately 2 cm from the cornua on the right fallopian tube. The left fallopian tube was identified and traced out to it's fimbriated end.  A Filshie clip was placed approximately 2 cm from the cornua on the left fallopian tube.     The fascia was then closed using 0 Vicryl. Further hemostasis was obtained with electrocautery.  Skin was closed with 4-0 Nylon vertical mattress sutures.    Instrument, sponge, and needle counts were correct prior the abdominal closure and at the conclusion of the case.               This document has been electronically signed by Tevin Johnson MD on October 4, 2018 8:36 AM

## 2018-10-04 NOTE — H&P (VIEW-ONLY)
[facility] [place]    [address] [facility] [address]    Phone:  [phone]    Fax:  [phone]       Thank You for choosing us for your health care visit. We are glad to serve you and happy to provide you with this summary of your visit. Please help us to ensure we have accurate records. If you find anything that needs to be changed, please let our staff know as soon as possible.          Your Demographic Information     Patient Name Sex     Evelyn Perry Female 1996       Ethnic Group Patient Race    Not of  or  Origin White      Your Visit Details     Date & Time Provider Department    2017 11:30 AM Courtney Pinzon MD [facility] [place]      We Ordered or Performed the Following     SERVICE TO DERMATOLOGY       Conditions Discussed Today or Order-Related Diagnoses        Comments    Rash, vesicular    -  Primary       Your Vitals Were     BP Pulse Temp Resp    110/80 (BP Location: RUE, Patient Position: Sitting, Cuff Size: Regular) 84 98.1 °F (36.7 °C) (Oral) 16    Weight LMP Smoking Status       164 lb 7.4 oz (74.6 kg) 2016 (Approximate) Never Smoker       Medications Prescribed or Re-Ordered Today     None      Your Current Medications Are        Disp Refills Start End    norgestimate-ethinyl estradiol (ORTHO-CYCLEN, 28,) 0.25-35 MG-MCG per tablet 28 tablet 4 2016     Sig - Route: Take 1 tablet by mouth daily. - Oral    Class: Eprescribe      Discontinued Medications        Reason for Discontinue    albuterol (PROAIR HFA) 108 (90 BASE) MCG/ACT inhaler Therapy Completed    triamcinolone (ARISTOCORT) 0.1 % cream Therapy Completed      Allergies     No Known Allergies      Immunizations History as of 2017     Name Date    DPT/HIB 1997, 1996, 1996    DTaP 2000, 9/10/1997    HEPATITIS A CHILD 2016  3:23 PM, 2013 11:33 AM    HIB 1997    HPV QUAD 2013 11:33 AM, 2010 11:40    Obstetric History and Physical    Chief Complaint   Patient presents with   • High Risk Gestation     32-year-old  with one previous delivery by  section of a 9 lbs. 8 oz. female infant.     2013  section at full-term delivering a 9 lbs. 8 oz. female infant named Linda.     She desires repeat  section and permanent surgical sterilization.  Signed sterilization papers 2018.     Having a boy named Waldemar.     Elevated 1 hour Glucola at 142.  Normal 3 hour GTT.  normal CBC.     Plan ultrasound at 32 weeks' gestation.     Ultrasound 2018 that showed a single intrauterine pregnancy with estimated fetal weight 5 lbs. 7 oz. which is 82nd percentile.  Amniotic fluid index 19.0 cm.  No abnormalities noted.     GBS swab negative 2018.     2018, size greater than dates, plan growth ultrasound in 2 weeks.     2018 ultrasound shows single intrauterine pregnancy in the cephalic position with estimated fetal weight 8 lbs. 6 oz. or 94th percentile.  DAINA normal at 12.4 cm.    Plan repeat low transverse  section and permanent surgical sterilization on .      Prenatal Information:  Prenatal Results     Initial Prenatal Labs     Test Value Reference Range Date Time    Hemoglobin 13.5 g/dL 12.0 - 15.5 g/dL 18 1023    Hematocrit 41.2 % 35.0 - 45.0 % 18 1023    Platelets 231 10*3/mm3 150 - 450 10*3/mm3 18 0806    Rubella IgG 67.6 IU/mL (H) 0.0 - 9.9 IU/mL 18 1023      Immune  Immune 18 1023    Hepatitis B SAg Negative  Negative 18 1023    Hepatitis C Ab Negative  Negative 18 1023    RPR Non-Reactive  Non-Reactive 18 1023    ABO O   18 1023    Rh Positive   18 1023    Antibody Screen Negative   18 1023    HIV Negative  Negative 18 1023    Urine Culture Mixed Farida Isolated   18 1023    Gonorrhea Negative  Negative 18  1023    Chlamydia Negative  Negative 02/23/18 1023    TSH 0.50 uIU/ml 0.46 - 4.68 uIU/ml 05/29/14 1041          2nd and 3rd Trimester     Test Value Reference Range Date Time    Hemoglobin (repeated) 12.4 g/dL 12.0 - 15.5 g/dL 06/18/18 0806    Hematocrit (repeated) 37.6 % 35.0 - 45.0 % 06/18/18 0806     mg/dL (H) 60 - 140 mg/dL 06/25/18 0909    Antibody Screen (repeated)        GTT Fasting        GTT 1 Hr        GTT 2 Hr        GTT 3 Hr        Group B Strep Negative  Negative 09/04/18 0956          Drug Screening     Test Value Reference Range Date Time    Amphetamine Screen Negative  Negative 02/23/18 1023    Barbiturate Screen Negative  Negative 02/23/18 1023    Benzodiazepine Screen Negative  Negative 02/23/18 1023    Methadone Screen Negative  Negative 02/23/18 1023    Phencyclidine Screen        Opiates Screen Negative  Negative 02/23/18 1023    THC Screen Negative  Negative 02/23/18 1023    Cocaine Screen Negative  Negative 02/23/18 1023    Propoxyphene Screen        Buprenorphine Screen        Methamphetamine Screen        Oxycodone Screen Negative  Negative 02/23/18 1023    Tryicyclic Antidepressants Screen              Other (Risk screening)     Test Value Reference Range Date Time    Varicella IgG        Parvovirus IgG        CMV IgG        Cystic Fibrosis        Hemoglobin electrophoresis        NIPT        MSAFP-4        AFP (for NTD only)                  External Prenatal Results     Pregnancy Outside Results - Transcribed From Office Records - See Scanned Records For Details     Test Value Date Time    Hgb 12.4 g/dL 06/18/18 0806    Hct 37.6 % 06/18/18 0806    ABO O  02/23/18 1023    Rh Positive  02/23/18 1023    Antibody Screen Negative  02/23/18 1023    Glucose Fasting GTT       Glucose Tolerance Test 1 hour       Glucose Tolerance Test 3 hour       Gonorrhea (discrete) Negative  02/23/18 1023    Chlamydia (discrete) Negative  02/23/18 1023    RPR Non-Reactive  02/23/18 1023    VDRL        AM, 8/31/2009    Hepatitis B Child 1/21/1997, 1996, 1996    MMR 12/19/2000, 4/21/1997    Meningococcal Conjugate MCV4P (Menactra) 1/29/2016  3:24 PM    Meningococcus 8/31/2009    Polio, INACTIVATED 12/19/2000    Polio, ORAL 9/10/1997, 1/21/1997, 1996    Tdap 8/25/2008    Varicella 8/25/2008, 9/19/2001      Problem List as of 1/18/2017     Acute atopic conjunctivitis    Allergic rhinitis, cause unspecified    Exercise induced bronchospasm      Patient Portal Signup     Manage health care for you and your family anytime, anywhere with the new [employer], your free online resource for quick and easy access to personal health information, scheduling appointments, refilling prescriptions, viewing test results, paying bills and more.  Sign up for a free and secure account. Please follow the instructions below to securely complete your enrollment.     1. Go to [email or web address]  2. Click Sign Up Now   3. Enter the Activation Code when prompted     Activation Code: [email or web address]  Expires: 2/17/2017 11:23 AM    If you have questions related to [employer], you can email [email or web address] or call [phone] to talk to our my[employer]a staff.  For questions related to your health, contact your physician’s office.  Please remember to dial 911 for medical emergencies.              Patient Instructions     None       Syphilis Antibody       Rubella 67.6 IU/mL (H) 18 1023      Immune  18 1023    HBsAg Negative  18 1023    Herpes Simplex Virus PCR       Herpes Simplex VIrus Culture       HIV Negative  18 1023    Hep C RNA Quant PCR       Hep C Antibody Negative  18 1023    AFP       Group B Strep Negative  18 0956    GBS Susceptibility to Clindamycin       GBS Susceptibility to Erythromycin       Fetal Fibronectin       Genetic Testing, Maternal Blood             Drug Screening     Test Value Date Time    Urine Drug Screen       Amphetamine Screen Negative  18 1023    Barbiturate Screen Negative  18 1023    Benzodiazepine Screen Negative  18 1023    Methadone Screen Negative  18 1023    Phencyclidine Screen       Opiates Screen Negative  18 1023    THC Screen Negative  18 1023    Cocaine Screen       Propoxyphene Screen       Buprenorphine Screen       Methamphetamine Screen       Oxycodone Screen Negative  18 1023    Tricyclic Antidepressants Screen                    Past OB History:     Obstetric History       T1      L1     SAB0   TAB0   Ectopic0   Molar0   Multiple0   Live Births1       # Outcome Date GA Lbr Cristi/2nd Weight Sex Delivery Anes PTL Lv   2 Current            1 Term 13 39w5d  4309 g (9 lb 8 oz) F CS-LTranv EPI, Spinal  ADRIANA      Complications: Gestational diabetes,Failure to progress in labor           ALLERGIES:     Allergies   Allergen Reactions   • Latex Rash     Pt states red, inflammation, and swelling at site         Home Medications:     Prior to Admission medications    Medication Sig Start Date End Date Taking? Authorizing Provider   hydrocortisone 1 % cream Apply  topically 2 (Two) Times a Day. 5/10/18  Yes Missy Ramirez MD   Prenatal Vit-Fe Fumarate-FA (MYNATAL PLUS) tablet Take 1 tablet by mouth Daily. 18  Yes Daria Modi APRN   clotrimazole-betamethasone (LOTRISONE) 1-0.05 % cream Apply   topically to the appropriate area as directed 2 (Two) Times a Day. 18   Tevin Johnson MD       Past Medical History: Past Medical History:   Diagnosis Date   • Acute pharyngitis    • Encounter for gynecological examination    • Encounter for  visit    • Encounter for routine adult health examination    • Headache    • History of gestational diabetes    • Ingrowing nail    • Maternal care due to low transverse uterine scar from previous  delivery 2018   • Migraine    • Nausea and vomiting    • Obesity    • Onychomycosis    • Pain in toe    • Presence of subcutaneous contraceptive implant    • Surgery follow-up       Past Surgical History Past Surgical History:   Procedure Laterality Date   •  SECTION  2013    Primary low transverse  section. Intrauterine pregnancy at 39 5/7 weeks. A1 gestational diabetes. Declines further induction of labor.   • NAIL BED REMOVAL/REVISION  2014    Excision of Nail and Nail Matrix, Permanent 17056 (1)         Family History: Family History   Problem Relation Age of Onset   • Diabetes Maternal Aunt    • Diabetes Maternal Grandmother    • Kidney failure Maternal Grandmother    • Diabetes Maternal Grandfather    • No Known Problems Father    • No Known Problems Mother    • Endometriosis Sister    • Hyperlipidemia Paternal Grandfather    • No Known Problems Paternal Grandmother    • No Known Problems Sister       Social History:  reports that she has quit smoking. She has never used smokeless tobacco.   reports that she does not drink alcohol.   reports that she does not use drugs.     Review of Systems     General ROS: Pertinent items are noted in HPI, all other systems reviewed and negative    Objective       Vital Signs Range for the last 24 hours  Temperature:     Temp Source:     BP: BP: (122)/(75) 122/75   Pulse:     Respirations:     SPO2:     O2 Amount (l/min):     O2 Devices     Weight: Weight:  [142 kg (314 lb)] 142 kg (314  lb)       OBGyn Exam  Physical Examination: General appearance - alert, well appearing, and in no distress and oriented to person, place, and time  Mental status - alert, oriented to person, place, and time, normal mood, behavior, speech, dress, motor activity, and thought processes  Neck - supple, no significant adenopathy  Chest - clear to auscultation, no wheezes, rales or rhonchi, symmetric air entry, no tachypnea, retractions or cyanosis  Heart - normal rate, regular rhythm, normal S1, S2, no murmurs, rubs, clicks or gallops  Abdomen - Gravid and nontender  Extremities - peripheral pulses normal, no pedal edema, no clubbing or cyanosis      The risks, benefits. and alternatives to  section were discussed.  The risks that were discussed included, but were not limited to, pain, infection, bleeding, hemorrhage; including need for transfusion to which she would have no objection; injury to the bowel, bladder, uterus, tubes, ovaries, or baby which could require further surgery or prolonged hospitalization.  Remote possibility of hysterectomy and/or salpingo-ophorectomy.  Remote possibility of death of baby and/or mother.  The patient understands that we'll have leg pumps on her legs to try and reduce the risk of clot formation her legs.  She understands that we will have early ambulation to also reduce the risk of blood clot formation and to reduce the risk of pneumonia.  She will be given antibiotics prior to her surgery to help decrease the risk for infection.  All questions were answered.    She was consented for PERMANENT SURGICAL STERILIZATION  We discussed the risk of no surgery to include pregnancy or undiagnosed disease process.  The risks that were discussed included, but were not limited to:  1. Bleeding with possible risk of blood transfusion. Blood products carry risk of HIV, infection, hepatitis, and transfusion reaction.  2. Infection requiring a antibiotic therapy.  3. Damage to internal  organs such as bowel, bladder, blood vessels, or a hole(fistula) bladder and vagina or rectum and vagina requiring further surgical repair.  4. Anesthetic risk to include death.  5. Risk of postsurgical depression or sexual dysfunction after sterilization.  6. Risk of failure of sterilization (Approximately 1/400) and subsequent risk of ectopic pregnancy.  7. Small risk for deep vein thrombosis were all explained.   8. The small risk for reoperation in the event of unanticipated bleeding or surgical injury was discussed.          All of her questions were answered fully. She left with a very clear understanding of the preoperative surgical indications and the nature of the surgery for which she is scheduled. She understands to be NPO after midnight.     Assessment:  1. Intrauterine pregnancy at 39 weeks on 2018 with previous  section desiring repeat  section and permanent surgical sterilization.    GBS status: Results in Past 30 Days  Result Component Current Result Ref Range Previous Result Ref Range   Group B Strep, DNA Negative (2018) Negative Not in Time Range        Plan:  1. Admit to labor and delivery on 2018 at 39 weeks gestation for repeat  section and permanent surgical sterilization.  2. Plan of care has been reviewed with staff and patient.  3. Risks, benefits of treatment plan have been discussed.  4. All questions have been answered.      Tevin Johnson MD  2018  9:23 AM

## 2018-10-04 NOTE — OP NOTE
Section Procedure Note    Madeleine Brooks    10/4/2018     Indications: 31yo [id number] at 39w0d gestation who was admitted for a scheduled repeat low transverse  delivery with tubal ligation.     Pre-operative Diagnosis: 31yo [id number] at 39w0d gestation who was admitted for a scheduled repeat low transverse  delivery with tubal ligation.    Post-operative Diagnosis: 31yo [id number] at 39w0d gestation who was admitted for a scheduled repeat low transverse  delivery with tubal ligation.    PROCEDURES PERFORMED:   SECTION REPEAT WITH TUBAL    SURGEON: Tevin Johnson MD, FACOG    RESIDENT SURGEON: Yefri Hoover DO, PGY-3    STAFF:   Circulator: Amirah Sotelo RN  Scrub Person: Deonna Wood & LENORA Nurse: Amanda Quijano RN  Assistant: Lizzie Varghese CSA    ANESTHESIA: Spinal    ANESTHESIA STAFF:  CRNA: Mauro Patel CRNA  Student Nurse Anesthetist: Desiree Allred SRNA    Findings:viable male infant, Waldemar, weight 9 lb. 10 oz. Apgars 9/9. Normal appearing uterus, tubes and ovaries.     Birth Information  YOB: 2018   Time of birth: 8:06 AM   Delivering clinician:     Sex: male   Delivery type: , Low Transverse   Breech type (if applicable):     Observed anomalies/comments:         Estimated Blood Loss:  1000           Drains: See                 Specimens: None               Complications:  None; patient tolerated the procedure well.           Disposition: Mother Baby Unit           Condition: stable    Procedure Details   After informed consent was obtained by discussing the risks, benefits, complications, treatment options, alternatives,  and expected outcomes of the  and tubal sterilization and after the patient agreed with the proposed plan, the operative consent was signed and in order, the patient was taken to the Operating Room, identified as Madeleine Brooks and the procedure verified as   Delivery and permanent surgical sterilization.    After induction of anesthesia, the patient was prepped,and her panniculus was elevated with the Traxi retractor and she was reprepped and draped in the usual sterile manner.  A Time Out was performed.      A Pfannenstiel incision was then made and carried sharply down through the subcutaneous tissue to the fascia. Fascial incision was made and extended transversely. The fascia was  from the underlying rectus tissue superiorly and inferiorly.  The rectus muscles were  in the midline. The peritoneum was entered bluntly.       A low transverse uterine incision was made. A vacuum was used to assist delivery of the fetal head with no pop offs. Delivered from vertex presentation was a viable male infant, Waldemar. See above for weight and apgars.      After the umbilical cord was clamped and cut, cord blood was obtained for evaluation. The placenta was removed intact and appeared normal. The uterus, tubes and ovaries appeared normal. The uterine incision was closed with a running locked sutures of #1 Chromic. Hemostasis was observed.     The right fallopian tube was identified and traced out to it's fimbriated end.  A Filshie clip was placed approximately 2 cm from the cornua on the right fallopian tube. The left fallopian tube was identified and traced out to it's fimbriated end.  A Filshie clip was placed approximately 2 cm from the cornua on the left fallopian tube.     The fascia was then closed using 0 Vicryl. Further hemostasis was obtained with electrocautery.  Skin was closed with 4-0 Nylon vertical mattress sutures.    Instrument, sponge, and needle counts were correct prior the abdominal closure and at the conclusion of the case.               This document has been electronically signed by Tevin Johnson MD on October 4, 2018 8:36 AM

## 2018-10-05 LAB
BASOPHILS # BLD AUTO: 0.01 10*3/MM3 (ref 0–0.2)
BASOPHILS NFR BLD AUTO: 0.1 % (ref 0–2)
DEPRECATED RDW RBC AUTO: 44.6 FL (ref 36.4–46.3)
EOSINOPHIL # BLD AUTO: 0.02 10*3/MM3 (ref 0–0.7)
EOSINOPHIL NFR BLD AUTO: 0.2 % (ref 0–7)
ERYTHROCYTE [DISTWIDTH] IN BLOOD BY AUTOMATED COUNT: 14.7 % (ref 11.5–14.5)
HCT VFR BLD AUTO: 31.5 % (ref 35–45)
HGB BLD-MCNC: 10.2 G/DL (ref 12–15.5)
IMM GRANULOCYTES # BLD: 0.03 10*3/MM3 (ref 0–0.02)
IMM GRANULOCYTES NFR BLD: 0.3 % (ref 0–0.5)
LYMPHOCYTES # BLD AUTO: 1.56 10*3/MM3 (ref 0.6–4.2)
LYMPHOCYTES NFR BLD AUTO: 14.5 % (ref 10–50)
MCH RBC QN AUTO: 27.2 PG (ref 26.5–34)
MCHC RBC AUTO-ENTMCNC: 32.4 G/DL (ref 31.4–36)
MCV RBC AUTO: 84 FL (ref 80–98)
MONOCYTES # BLD AUTO: 0.62 10*3/MM3 (ref 0–0.9)
MONOCYTES NFR BLD AUTO: 5.8 % (ref 0–12)
NEUTROPHILS # BLD AUTO: 8.54 10*3/MM3 (ref 2–8.6)
NEUTROPHILS NFR BLD AUTO: 79.1 % (ref 37–80)
PLATELET # BLD AUTO: 200 10*3/MM3 (ref 150–450)
PMV BLD AUTO: 10.8 FL (ref 8–12)
RBC # BLD AUTO: 3.75 10*6/MM3 (ref 3.77–5.16)
WBC NRBC COR # BLD: 10.78 10*3/MM3 (ref 3.2–9.8)

## 2018-10-05 PROCEDURE — 25010000002 METHOCARBAMOL 1000 MG/10ML SOLUTION 10 ML VIAL: Performed by: OBSTETRICS & GYNECOLOGY

## 2018-10-05 PROCEDURE — 25010000002 INFLUENZA VAC SUBUNIT QUAD 0.5 ML SUSPENSION PREFILLED SYRINGE: Performed by: OBSTETRICS & GYNECOLOGY

## 2018-10-05 PROCEDURE — 25010000002 CEFAZOLIN PER 500 MG: Performed by: OBSTETRICS & GYNECOLOGY

## 2018-10-05 PROCEDURE — 25010000002 KETOROLAC TROMETHAMINE PER 15 MG: Performed by: OBSTETRICS & GYNECOLOGY

## 2018-10-05 PROCEDURE — 90661 CCIIV3 VAC ABX FR 0.5 ML IM: CPT | Performed by: OBSTETRICS & GYNECOLOGY

## 2018-10-05 PROCEDURE — 85025 COMPLETE CBC W/AUTO DIFF WBC: CPT | Performed by: OBSTETRICS & GYNECOLOGY

## 2018-10-05 PROCEDURE — G0008 ADMIN INFLUENZA VIRUS VAC: HCPCS | Performed by: OBSTETRICS & GYNECOLOGY

## 2018-10-05 RX ADMIN — CEFAZOLIN SODIUM 2 G: 10 INJECTION, POWDER, FOR SOLUTION INTRAVENOUS at 05:27

## 2018-10-05 RX ADMIN — DOCUSATE SODIUM 100 MG: 100 CAPSULE, LIQUID FILLED ORAL at 20:12

## 2018-10-05 RX ADMIN — IBUPROFEN 600 MG: 600 TABLET ORAL at 00:43

## 2018-10-05 RX ADMIN — HYDROCODONE BITARTRATE AND ACETAMINOPHEN 1 TABLET: 5; 325 TABLET ORAL at 10:50

## 2018-10-05 RX ADMIN — HYDROCODONE BITARTRATE AND ACETAMINOPHEN 1 TABLET: 5; 325 TABLET ORAL at 20:12

## 2018-10-05 RX ADMIN — METHOCARBAMOL 1000 MG: 100 INJECTION INTRAMUSCULAR; INTRAVENOUS at 00:51

## 2018-10-05 RX ADMIN — HYDROCODONE BITARTRATE AND ACETAMINOPHEN 1 TABLET: 5; 325 TABLET ORAL at 15:53

## 2018-10-05 RX ADMIN — KETOROLAC TROMETHAMINE 30 MG: 30 INJECTION, SOLUTION INTRAMUSCULAR; INTRAVENOUS at 06:12

## 2018-10-05 RX ADMIN — INFLUENZA A VIRUS A/SINGAPORE/GP1908/2015 IVR-180 (H1N1) ANTIGEN (MDCK CELL DERIVED, PROPIOLACTONE INACTIVATED), INFLUENZA A VIRUS A/NORTH CAROLINA/04/2016 (H3N2) HEMAGGLUTININ ANTIGEN (MDCK CELL DERIVED, PROPIOLACTONE INACTIVATED), INFLUENZA B VIRUS B/IOWA/06/2017 HEMAGGLUTININ ANTIGEN (MDCK CELL DERIVED, PROPIOLACTONE INACTIVATED), INFLUENZA B VIRUS B/SINGAPORE/INFTT-16-0610/2016 HEMAGGLUTININ ANTIGEN (MDCK CELL DERIVED, PROPIOLACTONE INACTIVATED) 0.5 ML: 15; 15; 15; 15 INJECTION, SUSPENSION INTRAMUSCULAR at 06:12

## 2018-10-05 RX ADMIN — DOCUSATE SODIUM 100 MG: 100 CAPSULE, LIQUID FILLED ORAL at 10:50

## 2018-10-05 RX ADMIN — SIMETHICONE CHEW TAB 80 MG 80 MG: 80 TABLET ORAL at 15:54

## 2018-10-05 RX ADMIN — KETOROLAC TROMETHAMINE 30 MG: 30 INJECTION, SOLUTION INTRAMUSCULAR; INTRAVENOUS at 01:56

## 2018-10-05 NOTE — PROGRESS NOTES
[facility]  Madeleine Brooks  : 1985  MRN: [id number]  CSN: [id number]    Post-operative Day #1  Subjective   Her pain is well controlled.  Vaginal bleeding is appropriate amount.  She is passing gas and has not had a bowel movement. See out and voiding. Tolerating PO. Ambulating without difficulty. Breastfeeding.      Objective     Min/max vitals past 24 hours:   Temp  Min: 96.7 °F (35.9 °C)  Max: 98.1 °F (36.7 °C)  BP  Min: 106/58  Max: 136/70  Pulse  Min: 56  Max: 81  Pulse  Min: 56  Max: 81        General: well developed; well nourished  no acute distress   Abdomen: soft, non-tender; no masses  no umbilical or inginual hernias are present  incision is clean, dry and intact  fundus firm and non-tender   Pelvic: Not performed   Ext: Calves NT     Lab Results   Component Value Date    WBC 7.07 10/04/2018    HGB 11.3 (L) 10/04/2018    HCT 34.9 (L) 10/04/2018    MCV 83.7 10/04/2018     10/04/2018    RH Positive 10/04/2018    HEPBSAG Negative 2018        Assessment   1. 33yo [id number] POD #1 S/P Repeat LTCS. Doing well. VSS with benign exam. AM CBC pending. Meeting milestones.      Plan   1. Continue routine post-operative care        This document has been electronically signed by Yefri Hoover DO on 2018 6:02 AM

## 2018-10-05 NOTE — PLAN OF CARE
Problem: Patient Care Overview  Goal: Plan of Care Review  Outcome: Ongoing (interventions implemented as appropriate)   10/05/18 2780   Coping/Psychosocial   Plan of Care Reviewed With patient   Plan of Care Review   Progress improving   OTHER   Outcome Summary VSS, voiding, ambulating in room, pain controlled with PO pain mediations.      Goal: Individualization and Mutuality  Outcome: Ongoing (interventions implemented as appropriate)    Goal: Discharge Needs Assessment  Outcome: Ongoing (interventions implemented as appropriate)    Goal: Interprofessional Rounds/Family Conf  Outcome: Ongoing (interventions implemented as appropriate)      Problem: Fall Risk,  (Adult,Obstetrics,Pediatric)  Goal: Identify Related Risk Factors and Signs and Symptoms  Outcome: Ongoing (interventions implemented as appropriate)    Goal: Absence of  Fall/Drop  Outcome: Ongoing (interventions implemented as appropriate)      Problem: Postpartum ( Delivery) (Adult,Obstetrics,Pediatric)  Goal: Signs and Symptoms of Listed Potential Problems Will be Absent, Minimized or Managed (Postpartum)  Outcome: Ongoing (interventions implemented as appropriate)    Goal: Anesthesia/Sedation Recovery  Outcome: Ongoing (interventions implemented as appropriate)

## 2018-10-06 VITALS
TEMPERATURE: 97.8 F | SYSTOLIC BLOOD PRESSURE: 136 MMHG | BODY MASS INDEX: 50.68 KG/M2 | WEIGHT: 293 LBS | RESPIRATION RATE: 16 BRPM | DIASTOLIC BLOOD PRESSURE: 84 MMHG | HEART RATE: 93 BPM | OXYGEN SATURATION: 100 %

## 2018-10-06 RX ADMIN — HYDROCODONE BITARTRATE AND ACETAMINOPHEN 1 TABLET: 5; 325 TABLET ORAL at 07:33

## 2018-10-06 RX ADMIN — DOCUSATE SODIUM 100 MG: 100 CAPSULE, LIQUID FILLED ORAL at 08:41

## 2018-10-06 RX ADMIN — HYDROCODONE BITARTRATE AND ACETAMINOPHEN 1 TABLET: 5; 325 TABLET ORAL at 12:04

## 2018-10-06 RX ADMIN — HYDROCODONE BITARTRATE AND ACETAMINOPHEN 1 TABLET: 5; 325 TABLET ORAL at 02:36

## 2018-10-06 NOTE — DISCHARGE SUMMARY
[facility]  Madeleine Brooks  : 1985  MRN: [id number]  CSN: [id number]    Discharge Summary      Date of Admission: 10/4/2018   Date of Discharge:  10-6-18   Principle Discharge Dx: 1. Repeat Low transverse Caesarian Section with Tubal Ligation   Procedures Performed: Procedure(s):   SECTION REPEAT WITH TUBAL   Brief History: Patient is a 32 y.o. now  who presented to labor and delivery for repeat caesarian section.   Hospital Course: Her post-operative course was unremarkable.  On POD # 2 she expressed the desire for D/C. She had no febrile morbidity. She had passed gas, and was urinating normally. She was eating a regular diet without difficulty. She was ambulating well. Her incision was clean, dry and intact. Discharge instructions were given.  All questions were answered   Pending Studies:   Diet:  Discharge Condition: None  Regular  Stable   Discharge Activity: Vaginal rest for 6 weeks.  Other activity as tolerated.   Discharge Medications:    Your medication list      START taking these medications      Instructions Last Dose Given Next Dose Due   HYDROcodone-acetaminophen 5-325 MG per tablet  Commonly known as:  NORCO      Take 1 tablet by mouth Every 4 (Four) Hours As Needed for Severe Pain .       ibuprofen 800 MG tablet  Commonly known as:  ADVIL,MOTRIN      Take 1 tablet by mouth Every 8 (Eight) Hours As Needed for Mild Pain .          CONTINUE taking these medications      Instructions Last Dose Given Next Dose Due   MYNATAL PLUS tablet      Take 1 tablet by mouth Daily.          STOP taking these medications    clotrimazole-betamethasone 1-0.05 % cream  Commonly known as:  LOTRISONE        hydrocortisone 1 % cream              Where to Get Your Medications      You can get these medications from any pharmacy    Bring a paper prescription for each of these medications  · HYDROcodone-acetaminophen 5-325 MG per tablet  · ibuprofen 800 MG tablet        Discharge  Disposition: home     Follow-up: Follow up in 2 weeks       This note has been electronically signed.        This document has been electronically signed by Wilian Lynn III, MD on October 6, 2018 7:47 AM

## 2018-10-06 NOTE — PLAN OF CARE
Problem: Patient Care Overview  Goal: Plan of Care Review  Outcome: Ongoing (interventions implemented as appropriate)   10/05/18 0340 10/06/18 0441   Coping/Psychosocial   Plan of Care Reviewed With --  patient   Plan of Care Review   Progress --  improving   OTHER   Outcome Summary VSS, voiding, ambulating in room, pain controlled with PO pain mediations.  --        Problem: Fall Risk,  (Adult,Obstetrics,Pediatric)  Goal: Identify Related Risk Factors and Signs and Symptoms  Outcome: Ongoing (interventions implemented as appropriate)    Goal: Absence of Maternal Fall  Outcome: Ongoing (interventions implemented as appropriate)    Goal: Absence of [place] Fall/Drop  Outcome: Ongoing (interventions implemented as appropriate)      Problem: Postpartum ( Delivery) (Adult,Obstetrics,Pediatric)  Goal: Signs and Symptoms of Listed Potential Problems Will be Absent, Minimized or Managed (Postpartum)  Outcome: Ongoing (interventions implemented as appropriate)    Goal: Anesthesia/Sedation Recovery  Outcome: Outcome(s) achieved Date Met: 10/06/18

## 2018-10-06 NOTE — PROGRESS NOTES
[facility]  Madeleine Brooks  : 1985  MRN: [id number]  CSN: [id number]    Post-operative Day #2  Subjective   Her pain is well controlled.  Vaginal bleeding is essentially absent.  She is passing gas and has not had a bowel movement. Pt tolerating full diet without nausea and vomiting and is ambulating well. Pt desires discharge home with family member in room.     Objective     Min/max vitals past 24 hours:   Temp  Min: 96.5 °F (35.8 °C)  Max: 98.3 °F (36.8 °C)  BP  Min: 108/65  Max: 128/72  Pulse  Min: 77  Max: 97  Pulse  Min: 77  Max: 97        General: well developed; well nourished  no acute distress   Abdomen: soft, non-tender; no masses  no umbilical or inginual hernias are present  no hepato-splenomegaly  incision is covered by bandage, healing, clean, dry and intact  fundus firm @ -1 cm and non-tender   Pelvic: Not performed   Ext: Calves NT     Lab Results   Component Value Date    WBC 10.78 (H) 10/05/2018    HGB 10.2 (L) 10/05/2018    HCT 31.5 (L) 10/05/2018    MCV 84.0 10/05/2018     10/05/2018    RH Positive 10/04/2018    HEPBSAG Negative 2018        Assessment   1. POD #2 S/P Repeat LTCS     Plan   1. Discharge to home  2. Advised no tampons or intercourse for 6 weeks.  3. D/C questions all answered  4. Follow-up appointment in 2 week(s)          This document has been electronically signed by Wilian Lynn III, MD on 2018 7:44 AM

## 2018-10-06 NOTE — DISCHARGE INSTR - ACTIVITY
"[facility] to make a follow-up appointment for Tuesday October 16th, 2018 for suture removal.     Notify Dr of... heavy bleeding, passing clots, foul odor to your discharge, temperature above 100.4, burning when urinating, gapping or drainage from incision or episiotomy, or for pain not relieved by taking pain medication, redness or streaking in breasts, pain or redness in legs.    Take all medications as prescribed.  Continue taking iron or prenatal vitamin while breastfeeding or until you run out.  Take rest periods several times during the day.  \"Baby Blues\" are normal and may be present around the 3rd-4th day after delivery. If they last longer than 2-3 days, please let your Dr know.  Pelvic rest for 6 weeks. No douching, tampons, or intercourse  No driving for 2 weeks  No lifting anything heavier than the baby  Wear a good supportive bra 24 hours/day to prevent engorgement    "

## 2018-10-16 ENCOUNTER — OFFICE VISIT (OUTPATIENT)
Dept: OBSTETRICS AND GYNECOLOGY | Facility: CLINIC | Age: 33
End: 2018-10-16

## 2018-10-16 VITALS
DIASTOLIC BLOOD PRESSURE: 73 MMHG | SYSTOLIC BLOOD PRESSURE: 118 MMHG | HEIGHT: 66 IN | WEIGHT: 293 LBS | BODY MASS INDEX: 47.09 KG/M2

## 2018-10-16 DIAGNOSIS — R60.0 LOCALIZED EDEMA: Primary | ICD-10-CM

## 2018-10-16 DIAGNOSIS — Z48.02 VISIT FOR SUTURE REMOVAL: ICD-10-CM

## 2018-10-16 PROCEDURE — 99024 POSTOP FOLLOW-UP VISIT: CPT | Performed by: OBSTETRICS & GYNECOLOGY

## 2018-10-16 RX ORDER — HYDROCHLOROTHIAZIDE 25 MG/1
25 TABLET ORAL DAILY
Qty: 30 TABLET | Refills: 12 | Status: SHIPPED | OUTPATIENT
Start: 2018-10-16 | End: 2019-02-21

## 2018-10-16 RX ORDER — POTASSIUM CHLORIDE 1500 MG/1
20 TABLET, FILM COATED, EXTENDED RELEASE ORAL DAILY
Qty: 30 TABLET | Refills: 12 | Status: SHIPPED | OUTPATIENT
Start: 2018-10-16 | End: 2019-02-21

## 2018-10-16 NOTE — PROGRESS NOTES
Subjective   Chief Complaint   Patient presents with   • Post-op Follow-up     Post Op       Madeleine Brooks is a 32 y.o. year old  presenting for a postpartum visit.  She had a  (LTCS) with BTL 2018.  She delivered a 9 lbs. 10 oz. male infant named Waldemar.      Since delivery she has not been sexually active.  She does not have concerns about post-partum blues/depression.   She is breast feeding    Bilateral lower extremity edema which we will treat with hydrochlorothiazide and potassium.    Outpatient Medications Prior to Visit   Medication Sig Dispense Refill   • ibuprofen (ADVIL,MOTRIN) 800 MG tablet Take 1 tablet by mouth Every 8 (Eight) Hours As Needed for Mild Pain . 60 tablet 12   • Prenatal Vit-Fe Fumarate-FA (MYNATAL PLUS) tablet Take 1 tablet by mouth Daily. 30 each 12   • HYDROcodone-acetaminophen (NORCO) 5-325 MG per tablet Take 1 tablet by mouth Every 4 (Four) Hours As Needed for Severe Pain . 40 tablet 0     No facility-administered medications prior to visit.        The following portions of the patient's history were reviewed and updated as appropriate:  problem list, current medications, allergies, past family history, past medical history, past social history and past surgical history    Smoking status: Former Smoker                                                              Packs/day: 0.00      Years: 0.00      Smokeless tobacco: Never Used                        Review of Systems   Constitutional: Negative for activity change, appetite change, chills, diaphoresis, fatigue, fever and unexpected weight change.   Cardiovascular: Positive for leg swelling.   Gastrointestinal: Negative for abdominal pain, constipation, diarrhea and nausea.   Genitourinary: Negative for difficulty urinating, dysuria, menstrual problem, pelvic pain, urgency, vaginal bleeding, vaginal discharge and vaginal pain.   Neurological: Negative for headaches.  "  Psychiatric/Behavioral: Negative for dysphoric mood. The patient is not nervous/anxious.    All other systems reviewed and are negative.        Objective   /73   Ht 167.6 cm (66\")   Wt 134 kg (295 lb)   LMP  (LMP Unknown)   BMI 47.61 kg/m²     General:  no acute distress  obese - Body mass index is 47.61 kg/m².   Abdomen: incision is clean, dry, intact, without drainage and Sutures were removed   Pelvis: Not performed.        :  Problems Addressed this Visit     None      Visit Diagnoses     Localized edema    -  Primary    Visit for suture removal        Encounter for postpartum visit               New Medications Ordered This Visit   Medications   • hydrochlorothiazide (HYDRODIURIL) 25 MG tablet     Sig: Take 1 tablet by mouth Daily.     Dispense:  30 tablet     Refill:  12   • potassium chloride ER (K-TAB) 20 MEQ tablet controlled-release ER tablet     Sig: Take 1 tablet by mouth Daily.     Dispense:  30 tablet     Refill:  12       Plan   1. Hydrochlorothiazide and potassium.  2. Follow-up in 3 months.  Follow-up sooner as needed.         This note was electronically signed.    Tevin Johnson MD  October 16, 2018  "

## 2018-11-06 ENCOUNTER — TELEPHONE (OUTPATIENT)
Dept: FAMILY MEDICINE CLINIC | Facility: CLINIC | Age: 33
End: 2018-11-06

## 2018-11-06 RX ORDER — SUMATRIPTAN 50 MG/1
TABLET, FILM COATED ORAL
Qty: 9 TABLET | Refills: 2 | Status: SHIPPED | OUTPATIENT
Start: 2018-11-06 | End: 2019-05-07 | Stop reason: SDUPTHER

## 2018-11-06 NOTE — TELEPHONE ENCOUNTER
Madeleine wanted to know if you would order her Sumatriptan for her headaches, said she had to go off it while she was pregnant, but they helped with her headaches.  She uses [employer] [facility].

## 2019-05-07 ENCOUNTER — TELEPHONE (OUTPATIENT)
Dept: FAMILY MEDICINE CLINIC | Facility: CLINIC | Age: 34
End: 2019-05-07

## 2019-05-07 RX ORDER — CLOTRIMAZOLE AND BETAMETHASONE DIPROPIONATE 10; .64 MG/G; MG/G
CREAM TOPICAL 2 TIMES DAILY PRN
Qty: 45 G | Refills: 0 | Status: SHIPPED | OUTPATIENT
Start: 2019-05-07 | End: 2020-03-06 | Stop reason: SDUPTHER

## 2019-05-07 RX ORDER — SUMATRIPTAN 50 MG/1
TABLET, FILM COATED ORAL
Qty: 9 TABLET | Refills: 0 | Status: SHIPPED | OUTPATIENT
Start: 2019-05-07 | End: 2019-07-26 | Stop reason: SDUPTHER

## 2019-05-07 NOTE — TELEPHONE ENCOUNTER
Patient called asking for refills to be sent to [facility]  SUMAtriptan (IMITREX) 50 MG tablet    Also asking for a RX that Dr Johnson ordered for her. Clotrimazole Cream for heat rash

## 2019-07-26 ENCOUNTER — TELEPHONE (OUTPATIENT)
Dept: FAMILY MEDICINE CLINIC | Facility: CLINIC | Age: 34
End: 2019-07-26

## 2019-07-26 RX ORDER — SUMATRIPTAN 50 MG/1
TABLET, FILM COATED ORAL
Qty: 9 TABLET | Refills: 0 | Status: SHIPPED | OUTPATIENT
Start: 2019-07-26 | End: 2019-11-04 | Stop reason: SDUPTHER

## 2019-07-26 NOTE — TELEPHONE ENCOUNTER
GRETCHEN SIERRA IS NEEDING REFILL ON IMATREX TO BE SENT TO [facility] TODAY PLEASE  HER# [phone]

## 2019-11-04 ENCOUNTER — TELEPHONE (OUTPATIENT)
Dept: FAMILY MEDICINE CLINIC | Facility: CLINIC | Age: 34
End: 2019-11-04

## 2019-11-04 RX ORDER — SUMATRIPTAN 50 MG/1
TABLET, FILM COATED ORAL
Qty: 9 TABLET | Refills: 0 | Status: SHIPPED | OUTPATIENT
Start: 2019-11-04 | End: 2020-01-10 | Stop reason: SDUPTHER

## 2019-11-04 NOTE — TELEPHONE ENCOUNTER
Per Dr. Ramirez, Ms. Brooks has been called and advised that we will refill the script this time only and that she needs to be seen for an CPE and additional refills will be given at that appt.    She has been transferred to appointments to be scheduled.

## 2019-11-04 NOTE — TELEPHONE ENCOUNTER
Refill times 1, let her know that she needs to be seen at least once a year in order for me to continue to fill, ?if she is due for a CPE  Or just a recheck, needs to schedule one or the other, will work in if necessary

## 2020-01-10 ENCOUNTER — OFFICE VISIT (OUTPATIENT)
Dept: FAMILY MEDICINE CLINIC | Facility: CLINIC | Age: 35
End: 2020-01-10

## 2020-01-10 VITALS
WEIGHT: 293 LBS | HEIGHT: 66 IN | HEART RATE: 105 BPM | SYSTOLIC BLOOD PRESSURE: 110 MMHG | BODY MASS INDEX: 47.09 KG/M2 | DIASTOLIC BLOOD PRESSURE: 70 MMHG | OXYGEN SATURATION: 97 %

## 2020-01-10 DIAGNOSIS — IMO0002 CHRONIC MIGRAINE: Primary | ICD-10-CM

## 2020-01-10 DIAGNOSIS — E66.01 CLASS 3 SEVERE OBESITY DUE TO EXCESS CALORIES WITHOUT SERIOUS COMORBIDITY WITH BODY MASS INDEX (BMI) OF 45.0 TO 49.9 IN ADULT (HCC): ICD-10-CM

## 2020-01-10 DIAGNOSIS — Z00.00 ANNUAL PHYSICAL EXAM: ICD-10-CM

## 2020-01-10 PROCEDURE — 99213 OFFICE O/P EST LOW 20 MIN: CPT | Performed by: GENERAL PRACTICE

## 2020-01-10 RX ORDER — SUMATRIPTAN 50 MG/1
TABLET, FILM COATED ORAL
Qty: 27 TABLET | Refills: 4 | Status: SHIPPED | OUTPATIENT
Start: 2020-01-10 | End: 2021-01-18 | Stop reason: SDUPTHER

## 2020-01-10 NOTE — PATIENT INSTRUCTIONS

## 2020-01-10 NOTE — PROGRESS NOTES
Subjective   Madeleine Brooks is a 34 y.o. female.     Chief Complaint   Patient presents with   • Med Refill   • Headache     For review and evaluation of management of chronic migraines. Has about 10 migraines per month, last less than a day if she uses her imitrex, doesn't miss work. Has only had one where she had nausea and vomiting with it. Needs refills on her medications for this.  Is also having difficulty with weight loss, has tried multiple diet and exercise programs without success.  Obesity   This is a chronic problem. The current episode started more than 1 year ago. The problem occurs constantly. The problem has been unchanged. Associated symptoms include headaches. Pertinent negatives include no abdominal pain, arthralgias, chest pain, chills, congestion, coughing, fatigue, fever, joint swelling, myalgias, nausea, neck pain, numbness, rash, sore throat, vomiting or weakness. The symptoms are aggravated by stress. Treatments tried: diet and exercise.      The following portions of the patient's history were reviewed and updated as appropriate: allergies, current medications, past family and social history and problem list.    Outpatient Medications Prior to Visit   Medication Sig Dispense Refill   • clotrimazole-betamethasone (LOTRISONE) 1-0.05 % cream Apply  topically to the appropriate area as directed 2 (Two) Times a Day As Needed (prn). 45 g 0   • ibuprofen (ADVIL,MOTRIN) 800 MG tablet Take 1 tablet by mouth Every 8 (Eight) Hours As Needed for Mild Pain . 60 tablet 12   • SUMAtriptan (IMITREX) 50 MG tablet Take one tablet at onset of headache. May repeat dose one time in 2 hours if headache not relieved. 9 tablet 0     No facility-administered medications prior to visit.        Review of Systems   Constitutional: Negative.  Negative for chills, fatigue, fever and unexpected weight change.   HENT: Negative for congestion, ear pain, hearing loss, nosebleeds, rhinorrhea, sneezing, sore throat  "and tinnitus.    Eyes: Negative.  Negative for discharge.   Respiratory: Negative.  Negative for cough, shortness of breath and wheezing.    Cardiovascular: Negative.  Negative for chest pain and palpitations.   Gastrointestinal: Negative.  Negative for abdominal pain, constipation, diarrhea, nausea and vomiting.   Endocrine: Negative.    Genitourinary: Negative.  Negative for dysuria, frequency and urgency.   Musculoskeletal: Negative.  Negative for arthralgias, back pain, joint swelling, myalgias and neck pain.   Skin: Negative.  Negative for rash.   Allergic/Immunologic: Negative.    Neurological: Positive for headaches. Negative for dizziness, weakness and numbness.   Hematological: Negative.  Negative for adenopathy.   Psychiatric/Behavioral: Negative.  Negative for dysphoric mood and sleep disturbance. The patient is not nervous/anxious.        Objective     Visit Vitals  /70   Pulse 105   Ht 167.6 cm (66\")   Wt 134 kg (294 lb 8 oz)   SpO2 97%   Breastfeeding No   BMI 47.53 kg/m²     Physical Exam   Constitutional: She is oriented to person, place, and time. She appears well-developed and well-nourished. No distress.   HENT:   Head: Normocephalic and atraumatic.   Nose: Nose normal.   Mouth/Throat: Oropharynx is clear and moist.   Eyes: Pupils are equal, round, and reactive to light. Conjunctivae and EOM are normal. Right eye exhibits no discharge. Left eye exhibits no discharge.   Neck: No thyromegaly present.   Cardiovascular: Normal rate, regular rhythm, normal heart sounds and intact distal pulses.   Pulmonary/Chest: Effort normal and breath sounds normal.   Lymphadenopathy:     She has no cervical adenopathy.   Neurological: She is alert and oriented to person, place, and time.   Skin: Skin is warm and dry.   Psychiatric: She has a normal mood and affect.   Nursing note and vitals reviewed.       Assessment/Plan   Problem List Items Addressed This Visit     None      Visit Diagnoses     Chronic " migraine    -  Primary    Relevant Medications    SUMAtriptan (IMITREX) 50 MG tablet    Annual physical exam        Relevant Orders    Comprehensive Metabolic Panel    Lipid Panel    Urinalysis With Culture If Indicated -    Class 3 severe obesity due to excess calories without serious comorbidity with body mass index (BMI) of 45.0 to 49.9 in adult (CMS/[facility])              Continue current treatment. Start Belviq with calorie-reduced diet and exercise.     New Medications Ordered This Visit   Medications   • Lorcaserin HCl ER (BELVIQ XR) 20 MG tablet sustained-release 24 hour     Sig: Take 20 mg by mouth Daily.     Dispense:  30 tablet     Refill:  1   • SUMAtriptan (IMITREX) 50 MG tablet     Sig: Take one tablet at onset of headache. May repeat dose one time in 2 hours if headache not relieved.     Dispense:  27 tablet     Refill:  4     Per Dr. Ramirez, last refill until seen     Return in about 8 weeks (around 3/9/2020) for Annual physical.

## 2020-01-16 ENCOUNTER — TELEPHONE (OUTPATIENT)
Dept: FAMILY MEDICINE CLINIC | Facility: CLINIC | Age: 35
End: 2020-01-16

## 2020-01-23 PROBLEM — O99.213 OBESITY AFFECTING PREGNANCY IN THIRD TRIMESTER: Status: RESOLVED | Noted: 2018-07-31 | Resolved: 2020-01-23

## 2020-03-02 ENCOUNTER — LAB (OUTPATIENT)
Dept: LAB | Facility: HOSPITAL | Age: 35
End: 2020-03-02

## 2020-03-02 DIAGNOSIS — Z00.00 ANNUAL PHYSICAL EXAM: ICD-10-CM

## 2020-03-02 LAB
ALBUMIN SERPL-MCNC: 4.4 G/DL (ref 3.5–5.2)
ALBUMIN/GLOB SERPL: 1.8 G/DL
ALP SERPL-CCNC: 60 U/L (ref 39–117)
ALT SERPL W P-5'-P-CCNC: 17 U/L (ref 1–33)
ANION GAP SERPL CALCULATED.3IONS-SCNC: 12.4 MMOL/L (ref 5–15)
AST SERPL-CCNC: 17 U/L (ref 1–32)
BILIRUB SERPL-MCNC: 0.3 MG/DL (ref 0.2–1.2)
BILIRUB UR QL STRIP: NEGATIVE
BUN BLD-MCNC: 10 MG/DL (ref 6–20)
BUN/CREAT SERPL: 11.4 (ref 7–25)
CALCIUM SPEC-SCNC: 9.4 MG/DL (ref 8.6–10.5)
CHLORIDE SERPL-SCNC: 103 MMOL/L (ref 98–107)
CHOLEST SERPL-MCNC: 159 MG/DL (ref 0–200)
CLARITY UR: CLEAR
CO2 SERPL-SCNC: 25.6 MMOL/L (ref 22–29)
COLOR UR: YELLOW
CREAT BLD-MCNC: 0.88 MG/DL (ref 0.57–1)
GFR SERPL CREATININE-BSD FRML MDRD: 74 ML/MIN/1.73
GLOBULIN UR ELPH-MCNC: 2.5 GM/DL
GLUCOSE BLD-MCNC: 99 MG/DL (ref 65–99)
GLUCOSE UR STRIP-MCNC: NEGATIVE MG/DL
HDLC SERPL-MCNC: 43 MG/DL (ref 40–60)
HGB UR QL STRIP.AUTO: NEGATIVE
KETONES UR QL STRIP: NEGATIVE
LDLC SERPL CALC-MCNC: 95 MG/DL (ref 0–100)
LDLC/HDLC SERPL: 2.2 {RATIO}
LEUKOCYTE ESTERASE UR QL STRIP.AUTO: NEGATIVE
NITRITE UR QL STRIP: NEGATIVE
PH UR STRIP.AUTO: 5.5 [PH] (ref 5–8)
POTASSIUM BLD-SCNC: 4.2 MMOL/L (ref 3.5–5.2)
PROT SERPL-MCNC: 6.9 G/DL (ref 6–8.5)
PROT UR QL STRIP: NEGATIVE
SODIUM BLD-SCNC: 141 MMOL/L (ref 136–145)
SP GR UR STRIP: 1.02 (ref 1–1.03)
TRIGL SERPL-MCNC: 107 MG/DL (ref 0–150)
UROBILINOGEN UR QL STRIP: NORMAL
VLDLC SERPL-MCNC: 21.4 MG/DL (ref 5–40)

## 2020-03-02 PROCEDURE — 80061 LIPID PANEL: CPT

## 2020-03-02 PROCEDURE — 81003 URINALYSIS AUTO W/O SCOPE: CPT

## 2020-03-02 PROCEDURE — 36415 COLL VENOUS BLD VENIPUNCTURE: CPT

## 2020-03-02 PROCEDURE — 80053 COMPREHEN METABOLIC PANEL: CPT

## 2020-03-06 ENCOUNTER — OFFICE VISIT (OUTPATIENT)
Dept: FAMILY MEDICINE CLINIC | Facility: CLINIC | Age: 35
End: 2020-03-06

## 2020-03-06 VITALS
OXYGEN SATURATION: 99 % | HEIGHT: 66 IN | SYSTOLIC BLOOD PRESSURE: 105 MMHG | WEIGHT: 289 LBS | BODY MASS INDEX: 46.45 KG/M2 | DIASTOLIC BLOOD PRESSURE: 70 MMHG | HEART RATE: 78 BPM

## 2020-03-06 DIAGNOSIS — Z01.419 ENCOUNTER FOR GYNECOLOGICAL EXAMINATION WITHOUT ABNORMAL FINDING: ICD-10-CM

## 2020-03-06 DIAGNOSIS — Z00.00 ANNUAL PHYSICAL EXAM: Primary | ICD-10-CM

## 2020-03-06 PROCEDURE — 99395 PREV VISIT EST AGE 18-39: CPT | Performed by: GENERAL PRACTICE

## 2020-03-06 PROCEDURE — G0123 SCREEN CERV/VAG THIN LAYER: HCPCS | Performed by: GENERAL PRACTICE

## 2020-03-06 PROCEDURE — 87624 HPV HI-RISK TYP POOLED RSLT: CPT | Performed by: GENERAL PRACTICE

## 2020-03-06 RX ORDER — CLOTRIMAZOLE AND BETAMETHASONE DIPROPIONATE 10; .64 MG/G; MG/G
CREAM TOPICAL 2 TIMES DAILY PRN
Qty: 45 G | Refills: 0 | Status: SHIPPED | OUTPATIENT
Start: 2020-03-06 | End: 2020-12-28

## 2020-03-06 NOTE — PROGRESS NOTES
Subjective   Madeleine Brooks is a 34 y.o. female.     Chief Complaint   Patient presents with   • Annual Exam   • Hyperlipidemia       History of Present Illness     For annual wellness exam.  Labs reviewed. Due for pap. Periods are regular, last 5-6 days, heavy first few days.     The following portions of the patient's history were reviewed and updated as appropriate: allergies, current medications, past family and social history and problem list.    Outpatient Medications Prior to Visit   Medication Sig Dispense Refill   • ibuprofen (ADVIL,MOTRIN) 800 MG tablet Take 1 tablet by mouth Every 8 (Eight) Hours As Needed for Mild Pain . 60 tablet 12   • SUMAtriptan (IMITREX) 50 MG tablet Take one tablet at onset of headache. May repeat dose one time in 2 hours if headache not relieved. 27 tablet 4   • clotrimazole-betamethasone (LOTRISONE) 1-0.05 % cream Apply  topically to the appropriate area as directed 2 (Two) Times a Day As Needed (prn). 45 g 0   • Lorcaserin HCl ER (BELVIQ XR) 20 MG tablet sustained-release 24 hour Take 20 mg by mouth Daily. 30 tablet 1     No facility-administered medications prior to visit.        Review of Systems   Constitutional: Negative.  Negative for chills, fatigue, fever and unexpected weight change.   HENT: Negative.  Negative for congestion, ear pain, hearing loss, nosebleeds, rhinorrhea, sneezing, sore throat and tinnitus.    Eyes: Negative.  Negative for discharge.   Respiratory: Negative.  Negative for cough, shortness of breath and wheezing.    Cardiovascular: Negative.  Negative for chest pain and palpitations.   Gastrointestinal: Negative.  Negative for abdominal pain, constipation, diarrhea, nausea and vomiting.   Endocrine: Negative.    Genitourinary: Negative.  Negative for dysuria, frequency and urgency.   Musculoskeletal: Negative.  Negative for arthralgias, back pain, joint swelling, myalgias and neck pain.   Skin: Negative.  Negative for rash.  "  Allergic/Immunologic: Negative.    Neurological: Negative.  Negative for dizziness, weakness, numbness and headaches.   Hematological: Negative.  Negative for adenopathy.   Psychiatric/Behavioral: Negative.  Negative for dysphoric mood and sleep disturbance. The patient is not nervous/anxious.        Objective     Visit Vitals  /70 (BP Location: Left arm)   Pulse 78   Ht 167.6 cm (66\")   Wt 131 kg (289 lb)   SpO2 99%   BMI 46.65 kg/m²     Physical Exam   Constitutional: She is oriented to person, place, and time. She appears well-developed and well-nourished. No distress.   HENT:   Head: Normocephalic and atraumatic.   Nose: Nose normal.   Mouth/Throat: Oropharynx is clear and moist.   Eyes: Pupils are equal, round, and reactive to light. Conjunctivae and EOM are normal. Right eye exhibits no discharge. Left eye exhibits no discharge.   Neck: No tracheal deviation present. No thyromegaly present.   Cardiovascular: Normal rate, regular rhythm, normal heart sounds and intact distal pulses.   No murmur heard.  Pulmonary/Chest: Effort normal and breath sounds normal. No respiratory distress. She has no wheezes. She has no rales. She exhibits no tenderness. Right breast exhibits no inverted nipple, no mass, no nipple discharge, no skin change and no tenderness. Left breast exhibits no inverted nipple, no mass, no nipple discharge, no skin change and no tenderness.   Abdominal: Soft. Bowel sounds are normal. She exhibits no distension and no mass. There is no tenderness. No hernia.   Genitourinary:   Genitourinary Comments: Pap done   Musculoskeletal: Normal range of motion. She exhibits no deformity.   Lymphadenopathy:     She has no cervical adenopathy.   Neurological: She is alert and oriented to person, place, and time. She has normal reflexes.   Skin: Skin is warm and dry.   Psychiatric: She has a normal mood and affect. Her behavior is normal. Judgment and thought content normal.   Nursing note and vitals " reviewed.    Results for orders placed or performed in visit on 03/02/20   Comprehensive Metabolic Panel   Result Value Ref Range    Glucose 99 65 - 99 mg/dL    BUN 10 6 - 20 mg/dL    Creatinine 0.88 0.57 - 1.00 mg/dL    Sodium 141 136 - 145 mmol/L    Potassium 4.2 3.5 - 5.2 mmol/L    Chloride 103 98 - 107 mmol/L    CO2 25.6 22.0 - 29.0 mmol/L    Calcium 9.4 8.6 - 10.5 mg/dL    Total Protein 6.9 6.0 - 8.5 g/dL    Albumin 4.40 3.50 - 5.20 g/dL    ALT (SGPT) 17 1 - 33 U/L    AST (SGOT) 17 1 - 32 U/L    Alkaline Phosphatase 60 39 - 117 U/L    Total Bilirubin 0.3 0.2 - 1.2 mg/dL    eGFR Non African Amer 74 >60 mL/min/1.73    Globulin 2.5 gm/dL    A/G Ratio 1.8 g/dL    BUN/Creatinine Ratio 11.4 7.0 - 25.0    Anion Gap 12.4 5.0 - 15.0 mmol/L   Lipid Panel   Result Value Ref Range    Total Cholesterol 159 0 - 200 mg/dL    Triglycerides 107 0 - 150 mg/dL    HDL Cholesterol 43 40 - 60 mg/dL    LDL Cholesterol  95 0 - 100 mg/dL    VLDL Cholesterol 21.4 5 - 40 mg/dL    LDL/HDL Ratio 2.20    Urinalysis With Culture If Indicated - Urine, Clean Catch   Result Value Ref Range    Color, UA Yellow Yellow, Straw    Appearance, UA Clear Clear    pH, UA 5.5 5.0 - 8.0    Specific Gravity, UA 1.016 1.005 - 1.030    Glucose, UA Negative Negative    Ketones, UA Negative Negative    Bilirubin, UA Negative Negative    Blood, UA Negative Negative    Protein, UA Negative Negative    Leuk Esterase, UA Negative Negative    Nitrite, UA Negative Negative    Urobilinogen, UA 0.2 E.U./dL 0.2 - 1.0 E.U./dL      Assessment/Plan   Problem List Items Addressed This Visit     None      Visit Diagnoses     Annual physical exam    -  Primary    Relevant Orders    Comprehensive Metabolic Panel    Lipid Panel    Encounter for gynecological examination without abnormal finding        Relevant Orders    Liquid-based Pap Smear, Screening         Will notify regarding results. Patient's Body mass index is 46.65 kg/m². BMI is above normal parameters.  Recommendations include: exercise counseling, nutrition counseling and referral to a weight management program.   New Medications Ordered This Visit   Medications   • clotrimazole-betamethasone (LOTRISONE) 1-0.05 % cream     Sig: Apply  topically to the appropriate area as directed 2 (Two) Times a Day As Needed (prn).     Dispense:  45 g     Refill:  0     Return in about 1 year (around 3/6/2021) for Annual physical.

## 2020-03-10 LAB
GEN CATEG CVX/VAG CYTO-IMP: NORMAL
LAB AP CASE REPORT: NORMAL
LAB AP GYN ADDITIONAL INFORMATION: NORMAL
LAB AP GYN OTHER FINDINGS: NORMAL
PATH INTERP SPEC-IMP: NORMAL
STAT OF ADQ CVX/VAG CYTO-IMP: NORMAL

## 2020-03-11 LAB — HPV I/H RISK 4 DNA CVX QL PROBE+SIG AMP: NEGATIVE

## 2020-12-28 RX ORDER — CLOTRIMAZOLE AND BETAMETHASONE DIPROPIONATE 10; .64 MG/G; MG/G
CREAM TOPICAL
Qty: 45 G | Refills: 0 | Status: SHIPPED | OUTPATIENT
Start: 2020-12-28 | End: 2021-03-12 | Stop reason: SDUPTHER

## 2021-01-18 ENCOUNTER — OFFICE VISIT (OUTPATIENT)
Dept: FAMILY MEDICINE CLINIC | Facility: CLINIC | Age: 36
End: 2021-01-18

## 2021-01-18 VITALS
BODY MASS INDEX: 47.09 KG/M2 | HEIGHT: 66 IN | WEIGHT: 293 LBS | SYSTOLIC BLOOD PRESSURE: 110 MMHG | HEART RATE: 86 BPM | OXYGEN SATURATION: 98 % | DIASTOLIC BLOOD PRESSURE: 70 MMHG

## 2021-01-18 DIAGNOSIS — IMO0002 CHRONIC MIGRAINE: ICD-10-CM

## 2021-01-18 DIAGNOSIS — M62.838 TRAPEZIUS MUSCLE SPASM: Primary | ICD-10-CM

## 2021-01-18 PROCEDURE — 99213 OFFICE O/P EST LOW 20 MIN: CPT | Performed by: GENERAL PRACTICE

## 2021-01-18 RX ORDER — SUMATRIPTAN 50 MG/1
TABLET, FILM COATED ORAL
Qty: 27 TABLET | Refills: 4 | Status: SHIPPED | OUTPATIENT
Start: 2021-01-18 | End: 2021-03-12 | Stop reason: SDUPTHER

## 2021-01-18 RX ORDER — TIZANIDINE HYDROCHLORIDE 4 MG/1
4 CAPSULE, GELATIN COATED ORAL 3 TIMES DAILY PRN
Qty: 30 CAPSULE | Refills: 1 | Status: SHIPPED | OUTPATIENT
Start: 2021-01-18 | End: 2021-01-25

## 2021-01-18 NOTE — PROGRESS NOTES
"Subjective   Madeleine Brooks is a 35 y.o. female.   Chief Complaint   Patient presents with   • Shoulder Pain     right       Shoulder Injury   The right shoulder is affected. The incident occurred more than 1 week ago. There was no injury mechanism. The quality of the pain is described as stabbing. The pain radiates to the right hand. The pain is at a severity of 4/10. The pain is moderate. Associated symptoms include numbness. The symptoms are aggravated by movement, overhead lifting and palpation. She has tried NSAIDs for the symptoms. The treatment provided moderate relief.   Started noticing some discomfort as this is the shoulder her  lays his head, may sleep.  Then started to work out and pain seemed to get worse.  Is between the neck and the shoulder.  Has been causing her a few more migraines and needs a refill on her sumatriptan.    The following portions of the patient's history were reviewed and updated as appropriate: allergies, current medications, past social history and problem list.    Outpatient Medications Prior to Visit   Medication Sig Dispense Refill   • clotrimazole-betamethasone (LOTRISONE) 1-0.05 % cream APPLY TO THE AFFECTED AREA TWICE DAILY AS NEEDED 45 g 0   • ibuprofen (ADVIL,MOTRIN) 800 MG tablet Take 1 tablet by mouth Every 8 (Eight) Hours As Needed for Mild Pain . 60 tablet 12   • SUMAtriptan (IMITREX) 50 MG tablet Take one tablet at onset of headache. May repeat dose one time in 2 hours if headache not relieved. 27 tablet 4     No facility-administered medications prior to visit.      I have reviewed 12 systems with patient. Findings were negative except what is noted below and/or in history of present illness.   Review of Systems   Neurological: Positive for numbness.       Objective   Visit Vitals  /70   Pulse 86   Ht 167.6 cm (66\")   Wt (!) 136 kg (300 lb 9.6 oz)   SpO2 98%   BMI 48.52 kg/m²     Physical Exam  Vitals signs and nursing note reviewed. "   Constitutional:       General: She is not in acute distress.     Appearance: She is well-developed.   HENT:      Head: Normocephalic and atraumatic.      Nose: Nose normal.   Eyes:      General:         Right eye: No discharge.         Left eye: No discharge.      Conjunctiva/sclera: Conjunctivae normal.      Pupils: Pupils are equal, round, and reactive to light.   Neck:      Thyroid: No thyromegaly.   Cardiovascular:      Rate and Rhythm: Normal rate and regular rhythm.      Heart sounds: Normal heart sounds.   Pulmonary:      Effort: Pulmonary effort is normal.      Breath sounds: Normal breath sounds.   Musculoskeletal:        Back:    Lymphadenopathy:      Cervical: No cervical adenopathy.   Skin:     General: Skin is warm and dry.   Neurological:      Mental Status: She is alert and oriented to person, place, and time.         Notes brought forward are reviewed and updated if indicated.     Assessment/Plan   Problems Addressed this Visit     None      Visit Diagnoses     Trapezius muscle spasm    -  Primary    Relevant Medications    TiZANidine (ZANAFLEX) 4 MG capsule    Chronic migraine        Relevant Medications    TiZANidine (ZANAFLEX) 4 MG capsule    SUMAtriptan (IMITREX) 50 MG tablet      Diagnoses       Codes Comments    Trapezius muscle spasm    -  Primary ICD-10-CM: M62.838  ICD-9-CM: 728.85     Chronic migraine     ICD-10-CM: G43.709  ICD-9-CM: 346.70          Continue on ibuprofen, add tizanidine.  Advised to do stretching and massage of that area as well as ice.  Slow return to exercise as symptoms improve.  If they do not improve then physical therapy would be beneficial.    New Medications Ordered This Visit   Medications   • TiZANidine (ZANAFLEX) 4 MG capsule     Sig: Take 1 capsule by mouth 3 (Three) Times a Day As Needed for Muscle Spasms.     Dispense:  30 capsule     Refill:  1   • SUMAtriptan (IMITREX) 50 MG tablet     Sig: Take one tablet at onset of headache. May repeat dose one time in  2 hours if headache not relieved.     Dispense:  27 tablet     Refill:  4     Per Dr. Ramirez, last refill until seen     Return if symptoms worsen or fail to improve, for Next scheduled follow up.

## 2021-01-22 ENCOUNTER — TELEPHONE (OUTPATIENT)
Dept: FAMILY MEDICINE CLINIC | Facility: CLINIC | Age: 36
End: 2021-01-22

## 2021-01-22 NOTE — TELEPHONE ENCOUNTER
Patient calling asking of PA is done on TiZANidine (ZANAFLEX) 4 MG capsule????    Please call her

## 2021-01-25 RX ORDER — METHOCARBAMOL 500 MG/1
500 TABLET, FILM COATED ORAL 2 TIMES DAILY PRN
Qty: 20 TABLET | Refills: 1 | Status: SHIPPED | OUTPATIENT
Start: 2021-01-25 | End: 2021-03-12

## 2021-01-25 NOTE — TELEPHONE ENCOUNTER
Caller: Madeleine Brooks    Relationship to patient: Self    Best call back number: [phone]     Patient is needing:   Patient called and wanted to check on status of P.A. for TiZANidine (ZANAFLEX) 4 MG capsule. Patient wanted to request to see if there was any other Rx that could be called in so she wouldn't have to wait for P.A. as patient has never been able to fill medication that PCP prescribed.         [facility] confirmed...

## 2021-03-08 ENCOUNTER — LAB (OUTPATIENT)
Dept: LAB | Facility: HOSPITAL | Age: 36
End: 2021-03-08

## 2021-03-08 DIAGNOSIS — Z00.00 ANNUAL PHYSICAL EXAM: Primary | ICD-10-CM

## 2021-03-08 LAB
ALBUMIN SERPL-MCNC: 4 G/DL (ref 3.5–5.2)
ALBUMIN/GLOB SERPL: 1.3 G/DL
ALP SERPL-CCNC: 54 U/L (ref 39–117)
ALT SERPL W P-5'-P-CCNC: 18 U/L (ref 1–33)
ANION GAP SERPL CALCULATED.3IONS-SCNC: 7.9 MMOL/L (ref 5–15)
AST SERPL-CCNC: 21 U/L (ref 1–32)
BILIRUB SERPL-MCNC: 0.3 MG/DL (ref 0–1.2)
BUN SERPL-MCNC: 11 MG/DL (ref 6–20)
BUN/CREAT SERPL: 13.6 (ref 7–25)
CALCIUM SPEC-SCNC: 9.3 MG/DL (ref 8.6–10.5)
CHLORIDE SERPL-SCNC: 105 MMOL/L (ref 98–107)
CHOLEST SERPL-MCNC: 141 MG/DL (ref 0–200)
CO2 SERPL-SCNC: 25.1 MMOL/L (ref 22–29)
CREAT SERPL-MCNC: 0.81 MG/DL (ref 0.57–1)
GFR SERPL CREATININE-BSD FRML MDRD: 80 ML/MIN/1.73
GLOBULIN UR ELPH-MCNC: 3 GM/DL
GLUCOSE SERPL-MCNC: 96 MG/DL (ref 65–99)
HDLC SERPL-MCNC: 36 MG/DL (ref 40–60)
LDLC SERPL CALC-MCNC: 87 MG/DL (ref 0–100)
LDLC/HDLC SERPL: 2.41 {RATIO}
POTASSIUM SERPL-SCNC: 4.4 MMOL/L (ref 3.5–5.2)
PROT SERPL-MCNC: 7 G/DL (ref 6–8.5)
SODIUM SERPL-SCNC: 138 MMOL/L (ref 136–145)
TRIGL SERPL-MCNC: 92 MG/DL (ref 0–150)
VLDLC SERPL-MCNC: 18 MG/DL (ref 5–40)

## 2021-03-08 PROCEDURE — 80061 LIPID PANEL: CPT

## 2021-03-08 PROCEDURE — 80053 COMPREHEN METABOLIC PANEL: CPT

## 2021-03-08 PROCEDURE — 36415 COLL VENOUS BLD VENIPUNCTURE: CPT

## 2021-03-12 ENCOUNTER — OFFICE VISIT (OUTPATIENT)
Dept: FAMILY MEDICINE CLINIC | Facility: CLINIC | Age: 36
End: 2021-03-12

## 2021-03-12 VITALS
SYSTOLIC BLOOD PRESSURE: 110 MMHG | DIASTOLIC BLOOD PRESSURE: 70 MMHG | HEART RATE: 92 BPM | WEIGHT: 290 LBS | BODY MASS INDEX: 46.61 KG/M2 | OXYGEN SATURATION: 98 % | HEIGHT: 66 IN

## 2021-03-12 DIAGNOSIS — Z00.00 ANNUAL PHYSICAL EXAM: Primary | ICD-10-CM

## 2021-03-12 DIAGNOSIS — IMO0002 CHRONIC MIGRAINE: ICD-10-CM

## 2021-03-12 PROCEDURE — 99395 PREV VISIT EST AGE 18-39: CPT | Performed by: GENERAL PRACTICE

## 2021-03-12 RX ORDER — SUMATRIPTAN 50 MG/1
TABLET, FILM COATED ORAL
Qty: 27 TABLET | Refills: 3 | Status: SHIPPED | OUTPATIENT
Start: 2021-03-12 | End: 2022-03-18 | Stop reason: SDUPTHER

## 2021-03-12 RX ORDER — CLOTRIMAZOLE AND BETAMETHASONE DIPROPIONATE 10; .64 MG/G; MG/G
CREAM TOPICAL 2 TIMES DAILY PRN
Qty: 45 G | Refills: 2 | Status: SHIPPED | OUTPATIENT
Start: 2021-03-12 | End: 2022-07-11

## 2021-03-12 NOTE — PROGRESS NOTES
Subjective   Madeleine Brooks is a 35 y.o. female.     Chief Complaint   Patient presents with   • Annual Exam     labs       History of Present Illness     For annual wellness exam.  Labs reviewed. Has started exercising and is losing weight. Headaches are stable. Periods are heavy but starting to get a little better.     The following portions of the patient's history were reviewed and updated as appropriate: allergies, current medications, past family and social history and problem list.    Outpatient Medications Prior to Visit   Medication Sig Dispense Refill   • ibuprofen (ADVIL,MOTRIN) 800 MG tablet Take 1 tablet by mouth Every 8 (Eight) Hours As Needed for Mild Pain . 60 tablet 12   • clotrimazole-betamethasone (LOTRISONE) 1-0.05 % cream APPLY TO THE AFFECTED AREA TWICE DAILY AS NEEDED 45 g 0   • SUMAtriptan (IMITREX) 50 MG tablet Take one tablet at onset of headache. May repeat dose one time in 2 hours if headache not relieved. 27 tablet 4   • methocarbamol (Robaxin) 500 MG tablet Take 1 tablet by mouth 2 (Two) Times a Day As Needed for Muscle Spasms. 20 tablet 1     No facility-administered medications prior to visit.       Review of Systems   Constitutional: Negative.  Negative for chills, fatigue, fever and unexpected weight change.   HENT: Negative for congestion, ear pain, hearing loss, nosebleeds, rhinorrhea, sneezing, sore throat and tinnitus.    Eyes: Negative.  Negative for discharge.   Respiratory: Negative.  Negative for cough, shortness of breath and wheezing.    Cardiovascular: Negative.  Negative for chest pain and palpitations.   Gastrointestinal: Negative.  Negative for abdominal pain, constipation, diarrhea, nausea and vomiting.   Endocrine: Negative.    Genitourinary: Positive for menstrual problem (menorrhagia). Negative for dysuria, frequency and urgency.   Musculoskeletal: Negative.  Negative for arthralgias, back pain, joint swelling, myalgias and neck pain.   Skin: Negative.   "Negative for rash.   Allergic/Immunologic: Negative.    Neurological: Positive for headaches. Negative for dizziness, weakness and numbness.   Hematological: Negative.  Negative for adenopathy.   Psychiatric/Behavioral: Negative.  Negative for dysphoric mood and sleep disturbance. The patient is not nervous/anxious.        Objective     Visit Vitals  /70   Pulse 92   Ht 167.6 cm (66\")   Wt 132 kg (290 lb)   SpO2 98%   BMI 46.81 kg/m²     Physical Exam  Vitals and nursing note reviewed.   Constitutional:       General: She is not in acute distress.     Appearance: She is well-developed.   HENT:      Head: Normocephalic and atraumatic.      Nose: Nose normal.   Eyes:      General:         Right eye: No discharge.         Left eye: No discharge.      Conjunctiva/sclera: Conjunctivae normal.      Pupils: Pupils are equal, round, and reactive to light.   Neck:      Thyroid: No thyromegaly.      Trachea: No tracheal deviation.   Cardiovascular:      Rate and Rhythm: Normal rate and regular rhythm.      Heart sounds: Normal heart sounds. No murmur.   Pulmonary:      Effort: Pulmonary effort is normal. No respiratory distress.      Breath sounds: Normal breath sounds. No wheezing or rales.   Chest:      Chest wall: No tenderness.      Breasts:         Right: No inverted nipple, mass, nipple discharge, skin change or tenderness.         Left: No inverted nipple, mass, nipple discharge, skin change or tenderness.   Abdominal:      General: Bowel sounds are normal. There is no distension.      Palpations: Abdomen is soft. There is no mass.      Tenderness: There is no abdominal tenderness.      Hernia: No hernia is present.   Musculoskeletal:         General: No deformity. Normal range of motion.   Lymphadenopathy:      Cervical: No cervical adenopathy.   Skin:     General: Skin is warm and dry.   Neurological:      Mental Status: She is alert and oriented to person, place, and time.      Deep Tendon Reflexes: Reflexes " are normal and symmetric.   Psychiatric:         Behavior: Behavior normal.         Thought Content: Thought content normal.         Judgment: Judgment normal.       Results for orders placed or performed in visit on 03/08/21   Lipid Panel    Specimen: Blood   Result Value Ref Range    Total Cholesterol 141 0 - 200 mg/dL    Triglycerides 92 0 - 150 mg/dL    HDL Cholesterol 36 (L) 40 - 60 mg/dL    LDL Cholesterol  87 0 - 100 mg/dL    VLDL Cholesterol 18 5 - 40 mg/dL    LDL/HDL Ratio 2.41    Comprehensive Metabolic Panel    Specimen: Blood   Result Value Ref Range    Glucose 96 65 - 99 mg/dL    BUN 11 6 - 20 mg/dL    Creatinine 0.81 0.57 - 1.00 mg/dL    Sodium 138 136 - 145 mmol/L    Potassium 4.4 3.5 - 5.2 mmol/L    Chloride 105 98 - 107 mmol/L    CO2 25.1 22.0 - 29.0 mmol/L    Calcium 9.3 8.6 - 10.5 mg/dL    Total Protein 7.0 6.0 - 8.5 g/dL    Albumin 4.00 3.50 - 5.20 g/dL    ALT (SGPT) 18 1 - 33 U/L    AST (SGOT) 21 1 - 32 U/L    Alkaline Phosphatase 54 39 - 117 U/L    Total Bilirubin 0.3 0.0 - 1.2 mg/dL    eGFR Non African Amer 80 >60 mL/min/1.73    Globulin 3.0 gm/dL    A/G Ratio 1.3 g/dL    BUN/Creatinine Ratio 13.6 7.0 - 25.0    Anion Gap 7.9 5.0 - 15.0 mmol/L      Notes brought forward are reviewed and updated if indicated.     Assessment/Plan   Problems Addressed this Visit     None      Visit Diagnoses     Annual physical exam    -  Primary    Relevant Orders    CBC & Differential    Comprehensive Metabolic Panel    Lipid Panel    Urinalysis With Culture If Indicated -    Magnesium    Chronic migraine        Relevant Medications    SUMAtriptan (IMITREX) 50 MG tablet    Other Relevant Orders    Magnesium      Diagnoses       Codes Comments    Annual physical exam    -  Primary ICD-10-CM: Z00.00  ICD-9-CM: V70.0     Chronic migraine     ICD-10-CM: G43.709  ICD-9-CM: 346.70           Continue current treatment. Patient's Body mass index is 46.81 kg/m². BMI is above normal parameters. Recommendations include:  exercise counseling and nutrition counseling.     New Medications Ordered This Visit   Medications   • SUMAtriptan (IMITREX) 50 MG tablet     Sig: Take one tablet at onset of headache. May repeat dose one time in 2 hours if headache not relieved.     Dispense:  27 tablet     Refill:  3   • clotrimazole-betamethasone (LOTRISONE) 1-0.05 % cream     Sig: Apply  topically to the appropriate area as directed 2 (Two) Times a Day As Needed (rash).     Dispense:  45 g     Refill:  2     Return in about 1 year (around 3/12/2022) for Annual physical.

## 2021-11-24 ENCOUNTER — LAB (OUTPATIENT)
Dept: LAB | Facility: HOSPITAL | Age: 36
End: 2021-11-24

## 2021-11-24 ENCOUNTER — OFFICE VISIT (OUTPATIENT)
Dept: OBSTETRICS AND GYNECOLOGY | Facility: CLINIC | Age: 36
End: 2021-11-24

## 2021-11-24 VITALS
WEIGHT: 271.4 LBS | DIASTOLIC BLOOD PRESSURE: 70 MMHG | HEIGHT: 67 IN | SYSTOLIC BLOOD PRESSURE: 122 MMHG | BODY MASS INDEX: 42.6 KG/M2

## 2021-11-24 DIAGNOSIS — N92.0 MENORRHAGIA WITH REGULAR CYCLE: Primary | ICD-10-CM

## 2021-11-24 DIAGNOSIS — N93.9 ABNORMAL UTERINE BLEEDING (AUB): ICD-10-CM

## 2021-11-24 DIAGNOSIS — N94.6 DYSMENORRHEA: ICD-10-CM

## 2021-11-24 LAB
BASOPHILS # BLD AUTO: 0.05 10*3/MM3 (ref 0–0.2)
BASOPHILS NFR BLD AUTO: 0.6 % (ref 0–1.5)
DEPRECATED RDW RBC AUTO: 41.1 FL (ref 37–54)
EOSINOPHIL # BLD AUTO: 0.22 10*3/MM3 (ref 0–0.4)
EOSINOPHIL NFR BLD AUTO: 2.5 % (ref 0.3–6.2)
ERYTHROCYTE [DISTWIDTH] IN BLOOD BY AUTOMATED COUNT: 13.1 % (ref 12.3–15.4)
HCT VFR BLD AUTO: 41.1 % (ref 34–46.6)
HGB BLD-MCNC: 13.5 G/DL (ref 12–15.9)
IMM GRANULOCYTES # BLD AUTO: 0.02 10*3/MM3 (ref 0–0.05)
IMM GRANULOCYTES NFR BLD AUTO: 0.2 % (ref 0–0.5)
LYMPHOCYTES # BLD AUTO: 2.93 10*3/MM3 (ref 0.7–3.1)
LYMPHOCYTES NFR BLD AUTO: 33.2 % (ref 19.6–45.3)
MCH RBC QN AUTO: 28.4 PG (ref 26.6–33)
MCHC RBC AUTO-ENTMCNC: 32.8 G/DL (ref 31.5–35.7)
MCV RBC AUTO: 86.5 FL (ref 79–97)
MONOCYTES # BLD AUTO: 0.66 10*3/MM3 (ref 0.1–0.9)
MONOCYTES NFR BLD AUTO: 7.5 % (ref 5–12)
NEUTROPHILS NFR BLD AUTO: 4.94 10*3/MM3 (ref 1.7–7)
NEUTROPHILS NFR BLD AUTO: 56 % (ref 42.7–76)
NRBC BLD AUTO-RTO: 0 /100 WBC (ref 0–0.2)
PLATELET # BLD AUTO: 303 10*3/MM3 (ref 140–450)
PMV BLD AUTO: 10 FL (ref 6–12)
RBC # BLD AUTO: 4.75 10*6/MM3 (ref 3.77–5.28)
TSH SERPL DL<=0.05 MIU/L-ACNC: 1.06 UIU/ML (ref 0.27–4.2)
WBC NRBC COR # BLD: 8.82 10*3/MM3 (ref 3.4–10.8)

## 2021-11-24 PROCEDURE — 84443 ASSAY THYROID STIM HORMONE: CPT

## 2021-11-24 PROCEDURE — 99204 OFFICE O/P NEW MOD 45 MIN: CPT | Performed by: STUDENT IN AN ORGANIZED HEALTH CARE EDUCATION/TRAINING PROGRAM

## 2021-11-24 PROCEDURE — 85025 COMPLETE CBC W/AUTO DIFF WBC: CPT

## 2021-11-24 PROCEDURE — 36415 COLL VENOUS BLD VENIPUNCTURE: CPT

## 2021-11-24 NOTE — PROGRESS NOTES
"[facility]  Gynecology  Date of Service: 2021    CC: ablation consult, heavy periods    HPI  Madeleine Brooks is a 35 y.o.  premenopausal female who presents with complaints of heavy menstrual bleeding.      Seen by PCP Dr. Ramirez 3/12/21 for annual.  Chronic migraines, obesity  3/6/20 pap NIL/HPV neg  Periods are fairly regular (about every 3-4 weeks), last 6-7 days, 5 days heavier bleeding going through 4-5 super pads per day, sometimes soaking and sometimes flooding when standing (although often not through clothes). Significant cramping day before and first 2 days of period. Takes ibuprofen with some relief. 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 No intermenstrual bleeding or pain. Some fatigue. Last cycle felt dizzy on day 2 cycle, but no CP/SOA/presyncope or syncope. Menarche at 11-13 yo and were originally less heavy and crampy. Patient's mother with h/o \"hysterectomy for ovarian cysts\", no known h/o endometriosis or fibroids.    Periods progressively worse over time, but mostly since tubal ligation with  in 2018. On Nexplanon long ago with heavy bleeding, having to receive estrogen therapy.     Denies any vaginal itching, burning, irritation, or discharge. Denies any sexual dysfunction concerns. No pain or bleeding with intercourse. Denies any urinary symptoms.    Does get some migraines, denies aura; no h/o liver problems, no personal h/o blood clots and nonsmoker.    ROS  Review of Systems   Constitutional: Negative.    Eyes: Negative.    Respiratory: Negative.    Cardiovascular: Negative.    Gastrointestinal: Negative.    Endocrine: Negative.    Genitourinary: Positive for menstrual problem.   Musculoskeletal: Negative.    Skin: Negative.    Neurological: Positive for headaches.   Psychiatric/Behavioral: Negative.        GYN HISTORY  Menses: see above  History of STIs: denies  Last " pap smear:   Last Completed Pap Smear          PAP SMEAR (Every 3 Years) Next due on 3/6/2023    2020  Liquid-based Pap Smear, Screening    2020  HPV DNA Probe, Direct - ThinPrep Vial, Cervix    10/06/2015  Converted (Historical) Gyn Cytology    10/06/2015  HM Pap smear component of  PAP SMEAR    2014  Converted (Historical) Gyn Cytology    Only the first 5 history entries have been loaded, but more history exists.              Abnormal pap smear history: denies  Contraception: tubal ligation     OB HISTORY  OB History    Para Term  AB Living   2 2 2     2   SAB IAB Ectopic Molar Multiple Live Births           0 2      # Outcome Date GA Lbr Cristi/2nd Weight Sex Delivery Anes PTL Lv   2 Term 10/04/18 39w0d  4366 g (9 lb 10 oz) M CS-LTranv Spinal N ADRIANA   1 Term 13 39w5d  4309 g (9 lb 8 oz) F CS-LTranv EPI, Spinal  ADRIANA      Birth Comments: IOL started at 39w3d with cervidil and pitocin. failure to progress       Complications: Gestational diabetes, Failure to progress in labor     PAST MEDICAL HISTORY  Past Medical History:   Diagnosis Date   • Acute pharyngitis    • Encounter for gynecological examination    • Encounter for  visit    • Encounter for routine adult health examination    • Gestational diabetes    • Headache    • History of gestational diabetes    • Ingrowing nail    • Migraine    • Nausea and vomiting    • Obesity    • Onychomycosis    • Pain in toe    • Presence of subcutaneous contraceptive implant    • Surgery follow-up    • Varicella when i was a child     PAST SURGICAL HISTORY  Past Surgical History:   Procedure Laterality Date   •  SECTION  2013    Primary low transverse  section. Intrauterine pregnancy at 39 5/7 weeks. A1 gestational diabetes. Declines further induction of labor.   •  SECTION WITH TUBAL N/A 10/4/2018    Procedure:  SECTION REPEAT WITH TUBAL;  Surgeon: Tevin Johnson MD;  Location: [facility]  "LABOR DELIVERY;  Service: Obstetrics/Gynecology   • NAIL BED REMOVAL/REVISION  04/04/2014    Excision of Nail and Nail Matrix, Permanent 50089 (1)      • WISDOM TOOTH EXTRACTION       FAMILY HISTORY  Family History   Problem Relation Age of Onset   • Diabetes Maternal Aunt    • Diabetes Maternal Grandmother    • Kidney failure Maternal Grandmother    • Diabetes Maternal Grandfather    • No Known Problems Father    • No Known Problems Mother    • Endometriosis Sister    • Hyperlipidemia Paternal Grandfather    • No Known Problems Paternal Grandmother    • No Known Problems Sister      SOCIAL HISTORY  Social History     Socioeconomic History   • Marital status:    Tobacco Use   • Smoking status: Former Smoker     Packs/day: 0.00     Years: 0.00     Pack years: 0.00   • Smokeless tobacco: Never Used   Substance and Sexual Activity   • Alcohol use: No   • Drug use: No   • Sexual activity: Yes     Partners: Male     Birth control/protection: Surgical     Comment: last pap smear 10/6/2015 negative      ALLERGIES  Allergies   Allergen Reactions   • Latex Rash     Pt states red, inflammation, and swelling at site      HOME MEDICATIONS  Prior to Admission medications    Medication Sig Start Date End Date Taking? Authorizing Provider   clotrimazole-betamethasone (LOTRISONE) 1-0.05 % cream Apply  topically to the appropriate area as directed 2 (Two) Times a Day As Needed (rash). 3/12/21  Yes Missy Ramirez MD   ibuprofen (ADVIL,MOTRIN) 800 MG tablet Take 1 tablet by mouth Every 8 (Eight) Hours As Needed for Mild Pain . 10/4/18  Yes Tevin Johnson MD   SUMAtriptan (IMITREX) 50 MG tablet Take one tablet at onset of headache. May repeat dose one time in 2 hours if headache not relieved. 3/12/21  Yes Missy Ramirez MD     PE  /70   Ht 170.2 cm (67\")   Wt 123 kg (271 lb 6.4 oz)   LMP 11/21/2021 (Exact Date)   BMI 42.51 kg/m²        General: Alert, healthy, no distress, well nourished and well " developed.  Neurologic: Alert, oriented to person, place, and time.  Gait normal.  Cranial nerves II-XII grossly intact.  HEENT: Normocephalic, atraumatic.  Extraocular muscles intact.  Lungs: Normal respiratory effort.  Clear to auscultation bilaterally.  No wheezes, rhonci, or rales.  Heart: Regular rate and rhythm.  No murmur, rub or gallop.  Abdomen: Soft, obese, non-tender,pfannenstiel scars well healed, non-distended,no masses, no hepatosplenomegaly, no hernia.  Skin: No rash, no lesions.  Extremities: No cyanosis, clubbing or edema.  PELVIC EXAM:  External Genitalia/Vulva: Anatomy is normal, no significant redness of labia, no discharge on vulvar tissues, [place]'s and Bartholin's glands are normal, no ulcers, no condylomatous lesions.  Urethral meatus: Normal, no lesions, no prolapse.  Urethra: Normal, no masses, no tenderness with palpation.  Bladder: Normal, no fullness, no masses, no tenderness with palpation.  Vagina: Vaginal tissues are not inflamed, normal color and texture, no significant discharge present.  Pelvic support adequate.  Cervix: Normal, no lesions, no purulent discharge, no cervical motion tenderness.  Uterus: Normal size, shape, and consistency.  Good mobility noted.  Minimal descent noted with good support.  Adnexa: Normal size and shape bilaterally, no palpable mass bilaterally and non-tender bilaterally.  Rectal: ASHELY deferred.    IMPRESSION  Madeleine Brooks is a 35 y.o.  presenting with heavy menstrual bleeding, dysmenorrhea.    PLAN    1. Menorrhagia with regular cycle, Dysmenorrhea  - Discussed causes of AUB, including structural, hormonal, other  - Normal exam, no recent worsening, have just been bothersome and slowly progressive over time  - Plan for GYN US to evaluate endometrial lining, for structural causes  - CBC & Differential; Future  - TSH; Future  - Discussed may consider EMB if thickened endometrium or abnormal findings or if desires surgical management  -  Discussed medical management (provera, OCP, IUD, DMPA) or surgical management (endometrial ablation or hysterectomy) as well as risks and benefits of each  - Discussed only definitive management is hysterectomy, but this is more invasive and major surgery; endometrial ablation with amenorrhea in approximately 50%, improved bleeding additional 30%, worsening/unchanged bleeding/pain in up to 20%; discussed risks of failure of endometrial ablation higher in younger women with increased estrogen and time to menopause and concerns about possible difficulty in endometrial sampling in future if continued to have abnormal bleeding after ablation  - Patient is considering options and desired to discuss with her  prior to making any plans  - Gave pamphlets for Mirena IUD, endometrial ablation as these are options she is leaning towards  - Will followup after US to discuss results and management plan               This document has been electronically signed by Narcisa Leach DO on November 24, 2021 17:39 CST

## 2021-11-30 ENCOUNTER — HOSPITAL ENCOUNTER (OUTPATIENT)
Dept: ULTRASOUND IMAGING | Facility: HOSPITAL | Age: 36
Discharge: HOME | End: 2021-11-30
Admitting: STUDENT IN AN ORGANIZED HEALTH CARE EDUCATION/TRAINING PROGRAM

## 2021-11-30 ENCOUNTER — TELEPHONE (OUTPATIENT)
Dept: GENERAL RADIOLOGY | Facility: HOSPITAL | Age: 36
End: 2021-11-30

## 2021-11-30 DIAGNOSIS — N92.0 MENORRHAGIA WITH REGULAR CYCLE: ICD-10-CM

## 2021-11-30 PROCEDURE — 76830 TRANSVAGINAL US NON-OB: CPT

## 2021-12-01 ENCOUNTER — OFFICE VISIT (OUTPATIENT)
Dept: OBSTETRICS AND GYNECOLOGY | Facility: CLINIC | Age: 36
End: 2021-12-01

## 2021-12-01 VITALS
HEIGHT: 67 IN | WEIGHT: 271.6 LBS | SYSTOLIC BLOOD PRESSURE: 112 MMHG | DIASTOLIC BLOOD PRESSURE: 68 MMHG | BODY MASS INDEX: 42.63 KG/M2

## 2021-12-01 DIAGNOSIS — N92.0 MENORRHAGIA WITH REGULAR CYCLE: Primary | ICD-10-CM

## 2021-12-01 DIAGNOSIS — N94.6 DYSMENORRHEA: ICD-10-CM

## 2021-12-01 PROCEDURE — 99213 OFFICE O/P EST LOW 20 MIN: CPT | Performed by: STUDENT IN AN ORGANIZED HEALTH CARE EDUCATION/TRAINING PROGRAM

## 2021-12-01 NOTE — PROGRESS NOTES
[facility]  Gynecology  Date of Service: 2021    CC: US followup, management of AUB    HPI  Madeleine Brooks is a 35 y.o.  premenopausal female who presents with complaints of menorrhagia and dysmenorrhea with regular cycle. Has been problem since tubal ligation with CS in 2018, notes long h/o heavy menses, however. Not worsening over last year or two but has become frustrated and ready to manage. Not on hormone therapy.     Had GYN US on 21: Ut 9.8 x 5.3 x 6.4 cm, no lesions in uterus, benign nabothian cysts, ES 13.4 mm normal appearing, ROV 4.5 x 3 x 2.4 cm; LOV 4 x 2.5 x 2.7 cm, small free fluid, normal US    ROS  Review of Systems   Constitutional: Negative.    Eyes: Negative.    Respiratory: Negative.    Cardiovascular: Negative.    Gastrointestinal: Negative.    Genitourinary: Positive for menstrual problem.   Musculoskeletal: Negative.    Neurological: Positive for headaches.   Psychiatric/Behavioral: Negative.        GYN HISTORY  Menses: see above  History of STIs: denies  Last pap smear:   Last Completed Pap Smear          PAP SMEAR (Every 3 Years) Next due on 3/6/2023    2020  Liquid-based Pap Smear, Screening    2020  HPV DNA Probe, Direct - ThinPrep Vial, Cervix    10/06/2015  Converted (Historical) Gyn Cytology    10/06/2015  HM Pap smear component of  PAP SMEAR    2014  Converted (Historical) Gyn Cytology    Only the first 5 history entries have been loaded, but more history exists.              Abnormal pap smear history: denies  Contraception: tubal ligation     OB HISTORY  OB History    Para Term  AB Living   2 2 2     2   SAB IAB Ectopic Molar Multiple Live Births           0 2      # Outcome Date GA Lbr Cristi/2nd Weight Sex Delivery Anes PTL Lv   2 Term 10/04/18 39w0d  4366 g (9 lb 10 oz) M CS-LTranv Spinal N ADRIANA   1 Term 13 39w5d  4309 g (9 lb 8 oz) F CS-LTranv EPI, Spinal  ADRIANA      Birth Comments: IOL started at  39w3d with cervidil and pitocin. failure to progress       Complications: Gestational diabetes, Failure to progress in labor     PAST MEDICAL HISTORY  Past Medical History:   Diagnosis Date   • Acute pharyngitis    • Encounter for gynecological examination    • Encounter for  visit    • Encounter for routine adult health examination    • Gestational diabetes    • Headache    • History of gestational diabetes    • Ingrowing nail    • Migraine    • Nausea and vomiting    • Obesity    • Onychomycosis    • Pain in toe    • Presence of subcutaneous contraceptive implant    • Surgery follow-up    • Varicella when i was a child     PAST SURGICAL HISTORY  Past Surgical History:   Procedure Laterality Date   •  SECTION  2013    Primary low transverse  section. Intrauterine pregnancy at 39 5/7 weeks. A1 gestational diabetes. Declines further induction of labor.   •  SECTION WITH TUBAL N/A 10/4/2018    Procedure:  SECTION REPEAT WITH TUBAL;  Surgeon: Tevin Johnson MD;  Location: [facility] LABOR DELIVERY;  Service: Obstetrics/Gynecology   • NAIL BED REMOVAL/REVISION  2014    Excision of Nail and Nail Matrix, Permanent 07590 (1)      • WISDOM TOOTH EXTRACTION       FAMILY HISTORY  Family History   Problem Relation Age of Onset   • Diabetes Maternal Aunt    • Diabetes Maternal Grandmother    • Kidney failure Maternal Grandmother    • Diabetes Maternal Grandfather    • No Known Problems Father    • No Known Problems Mother    • Endometriosis Sister    • Hyperlipidemia Paternal Grandfather    • No Known Problems Paternal Grandmother    • No Known Problems Sister      SOCIAL HISTORY  Social History     Socioeconomic History   • Marital status:    Tobacco Use   • Smoking status: Former Smoker     Packs/day: 0.00     Years: 0.00     Pack years: 0.00   • Smokeless tobacco: Never Used   Substance and Sexual Activity   • Alcohol use: No   • Drug use: No   • Sexual activity:  "Yes     Partners: Male     Birth control/protection: Surgical     Comment: last pap smear 10/6/2015 negative      ALLERGIES  Allergies   Allergen Reactions   • Latex Rash     Pt states red, inflammation, and swelling at site      HOME MEDICATIONS  Prior to Admission medications    Medication Sig Start Date End Date Taking? Authorizing Provider   clotrimazole-betamethasone (LOTRISONE) 1-0.05 % cream Apply  topically to the appropriate area as directed 2 (Two) Times a Day As Needed (rash). 3/12/21   Missy Ramirez MD   ibuprofen (ADVIL,MOTRIN) 800 MG tablet Take 1 tablet by mouth Every 8 (Eight) Hours As Needed for Mild Pain . 10/4/18   Tevin Johnson MD   SUMAtriptan (IMITREX) 50 MG tablet Take one tablet at onset of headache. May repeat dose one time in 2 hours if headache not relieved. 3/12/21   Missy Ramirez MD     PE  /68   Ht 170.2 cm (67\")   Wt 123 kg (271 lb 9.6 oz)   LMP 2021 (Exact Date)   BMI 42.54 kg/m²        General: Alert, healthy, no distress, well nourished and well developed.  Neurologic: Alert, oriented to person, place, and time.  Gait normal.  Cranial nerves II-XII grossly intact.  HEENT: Normocephalic, atraumatic.  Extraocular muscles intact.  Lungs: Normal respiratory effort.  Skin: No rash, no lesions.  Extremities: No cyanosis, clubbing or edema.      IMPRESSION  Madeleine Brooks is a 35 y.o.  presenting for followup of menorrhagia, dysmenorrhea.    PLAN    1. Menorrhagia with regular cycle, dysmenorrhea  - See discussion prior note  - Discussed US with normal/borderline thickening of endometrial stripe for menstruating woman, mildly enlarged uterus without findings suggestive of adenomyosis, fibroids, or other pathology no other abnormalities  - Without any recent changes in menses but frustration with continued heavy bleeding discussed options for treatment  - Discussed may consider EMB if desires surgical management, expectant management, or continued " HMB with medical management  - Discussed again today with patient and her  medical management (provera, OCP, IUD, DMPA) or surgical management (endometrial ablation or hysterectomy) as well as risks and benefits of each  - Discussed only definitive management is hysterectomy, but this is more invasive and major surgery; endometrial ablation with amenorrhea in approximately 50%, improved bleeding additional 30%, worsening/unchanged bleeding/pain in up to 20%; discussed risks of failure of endometrial ablation higher in younger women with increased estrogen and time to menopause and concerns about possible difficulty in endometrial sampling in future if continued to have abnormal bleeding after ablation; patient very hesitant about IUD and leaning towards endometrial ablation but she and  wish to go home and discuss and call me with her decision of how she would like to proceed  - Previously gave pamphlets for Mirena IUD, endometrial ablation                This document has been electronically signed by Narcisa Leach DO on December 1, 2021 21:57 CST

## 2022-01-05 ENCOUNTER — TELEPHONE (OUTPATIENT)
Dept: FAMILY MEDICINE CLINIC | Facility: CLINIC | Age: 37
End: 2022-01-05

## 2022-01-05 NOTE — TELEPHONE ENCOUNTER
PA DENIED DUE TO PT NOT BEING ON A PREVENTIVE SUCH AS A ANTIEPILEPTIC DRUG, BETA BLOCKER, ANTIDEPRESSANT,NON-STEROIDAL IN-FLAMMATORY, AJOVY OR EMGALITY, BOTOX

## 2022-03-16 ENCOUNTER — LAB (OUTPATIENT)
Dept: LAB | Facility: HOSPITAL | Age: 37
End: 2022-03-16

## 2022-03-16 DIAGNOSIS — Z00.00 ANNUAL PHYSICAL EXAM: Primary | ICD-10-CM

## 2022-03-16 LAB
ALBUMIN SERPL-MCNC: 4.3 G/DL (ref 3.5–5.2)
ALBUMIN/GLOB SERPL: 1.7 G/DL
ALP SERPL-CCNC: 56 U/L (ref 39–117)
ALT SERPL W P-5'-P-CCNC: 17 U/L (ref 1–33)
ANION GAP SERPL CALCULATED.3IONS-SCNC: 7.8 MMOL/L (ref 5–15)
ANISOCYTOSIS BLD QL: NORMAL
AST SERPL-CCNC: 39 U/L (ref 1–32)
BILIRUB SERPL-MCNC: 0.4 MG/DL (ref 0–1.2)
BILIRUB UR QL STRIP: NEGATIVE
BUN SERPL-MCNC: 14 MG/DL (ref 6–20)
BUN/CREAT SERPL: 17.3 (ref 7–25)
CALCIUM SPEC-SCNC: 9.6 MG/DL (ref 8.6–10.5)
CHLORIDE SERPL-SCNC: 104 MMOL/L (ref 98–107)
CHOLEST SERPL-MCNC: 163 MG/DL (ref 0–200)
CLARITY UR: CLEAR
CO2 SERPL-SCNC: 26.2 MMOL/L (ref 22–29)
COLOR UR: YELLOW
CREAT SERPL-MCNC: 0.81 MG/DL (ref 0.57–1)
DEPRECATED RDW RBC AUTO: 39.1 FL (ref 37–54)
EGFRCR SERPLBLD CKD-EPI 2021: 96.6 ML/MIN/1.73
EOSINOPHIL # BLD MANUAL: 0.09 10*3/MM3 (ref 0–0.4)
EOSINOPHIL NFR BLD MANUAL: 2 % (ref 0.3–6.2)
ERYTHROCYTE [DISTWIDTH] IN BLOOD BY AUTOMATED COUNT: 12.7 % (ref 12.3–15.4)
GLOBULIN UR ELPH-MCNC: 2.5 GM/DL
GLUCOSE SERPL-MCNC: 86 MG/DL (ref 65–99)
GLUCOSE UR STRIP-MCNC: NEGATIVE MG/DL
HCT VFR BLD AUTO: 42.7 % (ref 34–46.6)
HDLC SERPL-MCNC: 48 MG/DL (ref 40–60)
HGB BLD-MCNC: 13.6 G/DL (ref 12–15.9)
HGB UR QL STRIP.AUTO: NEGATIVE
KETONES UR QL STRIP: NEGATIVE
LDLC SERPL CALC-MCNC: 96 MG/DL (ref 0–100)
LDLC/HDLC SERPL: 1.97 {RATIO}
LEUKOCYTE ESTERASE UR QL STRIP.AUTO: NEGATIVE
LYMPHOCYTES # BLD MANUAL: 1.64 10*3/MM3 (ref 0.7–3.1)
LYMPHOCYTES NFR BLD MANUAL: 6 % (ref 5–12)
MAGNESIUM SERPL-MCNC: 2.2 MG/DL (ref 1.6–2.6)
MCH RBC QN AUTO: 27.6 PG (ref 26.6–33)
MCHC RBC AUTO-ENTMCNC: 31.9 G/DL (ref 31.5–35.7)
MCV RBC AUTO: 86.6 FL (ref 79–97)
MONOCYTES # BLD: 0.28 10*3/MM3 (ref 0.1–0.9)
NEUTROPHILS # BLD AUTO: 2.67 10*3/MM3 (ref 1.7–7)
NEUTROPHILS NFR BLD MANUAL: 57 % (ref 42.7–76)
NITRITE UR QL STRIP: NEGATIVE
PH UR STRIP.AUTO: 6 [PH] (ref 5–8)
PLAT MORPH BLD: NORMAL
PLATELET # BLD AUTO: 261 10*3/MM3 (ref 140–450)
PMV BLD AUTO: 10.4 FL (ref 6–12)
POTASSIUM SERPL-SCNC: 4.7 MMOL/L (ref 3.5–5.2)
PROT SERPL-MCNC: 6.8 G/DL (ref 6–8.5)
PROT UR QL STRIP: NEGATIVE
RBC # BLD AUTO: 4.93 10*6/MM3 (ref 3.77–5.28)
SODIUM SERPL-SCNC: 138 MMOL/L (ref 136–145)
SP GR UR STRIP: 1.02 (ref 1–1.03)
TRIGL SERPL-MCNC: 102 MG/DL (ref 0–150)
UROBILINOGEN UR QL STRIP: NORMAL
VARIANT LYMPHS NFR BLD MANUAL: 35 % (ref 19.6–45.3)
VLDLC SERPL-MCNC: 19 MG/DL (ref 5–40)
WBC MORPH BLD: NORMAL
WBC NRBC COR # BLD: 4.68 10*3/MM3 (ref 3.4–10.8)

## 2022-03-16 PROCEDURE — 83735 ASSAY OF MAGNESIUM: CPT

## 2022-03-16 PROCEDURE — 80061 LIPID PANEL: CPT

## 2022-03-16 PROCEDURE — 80053 COMPREHEN METABOLIC PANEL: CPT

## 2022-03-16 PROCEDURE — 81003 URINALYSIS AUTO W/O SCOPE: CPT

## 2022-03-16 PROCEDURE — 85027 COMPLETE CBC AUTOMATED: CPT

## 2022-03-16 PROCEDURE — 36415 COLL VENOUS BLD VENIPUNCTURE: CPT

## 2022-03-16 PROCEDURE — 85007 BL SMEAR W/DIFF WBC COUNT: CPT

## 2022-03-18 ENCOUNTER — OFFICE VISIT (OUTPATIENT)
Dept: FAMILY MEDICINE CLINIC | Facility: CLINIC | Age: 37
End: 2022-03-18

## 2022-03-18 VITALS
HEART RATE: 80 BPM | SYSTOLIC BLOOD PRESSURE: 125 MMHG | WEIGHT: 276 LBS | BODY MASS INDEX: 43.32 KG/M2 | DIASTOLIC BLOOD PRESSURE: 72 MMHG | OXYGEN SATURATION: 99 % | HEIGHT: 67 IN

## 2022-03-18 DIAGNOSIS — Z00.00 ANNUAL PHYSICAL EXAM: Primary | ICD-10-CM

## 2022-03-18 DIAGNOSIS — G43.009 MIGRAINE WITHOUT AURA AND WITHOUT STATUS MIGRAINOSUS, NOT INTRACTABLE: ICD-10-CM

## 2022-03-18 PROCEDURE — 2014F MENTAL STATUS ASSESS: CPT | Performed by: GENERAL PRACTICE

## 2022-03-18 PROCEDURE — 3008F BODY MASS INDEX DOCD: CPT | Performed by: GENERAL PRACTICE

## 2022-03-18 PROCEDURE — 99395 PREV VISIT EST AGE 18-39: CPT | Performed by: GENERAL PRACTICE

## 2022-03-18 RX ORDER — SUMATRIPTAN 50 MG/1
TABLET, FILM COATED ORAL
Qty: 9 TABLET | Refills: 5 | Status: SHIPPED | OUTPATIENT
Start: 2022-03-18 | End: 2023-03-20 | Stop reason: SDUPTHER

## 2022-03-18 NOTE — PROGRESS NOTES
"Subjective   Madeleine Brooks is a 36 y.o. female.     Chief Complaint   Patient presents with   • Annual Exam       History of Present Illness     For annual wellness exam. Records reviewed. Recent labs, xrays reviewed and medications reconciled.      The following portions of the patient's history were reviewed and updated as appropriate: allergies, current medications, past family and social history and problem list.    Outpatient Medications Prior to Visit   Medication Sig Dispense Refill   • clotrimazole-betamethasone (LOTRISONE) 1-0.05 % cream Apply  topically to the appropriate area as directed 2 (Two) Times a Day As Needed (rash). 45 g 2   • ibuprofen (ADVIL,MOTRIN) 800 MG tablet Take 1 tablet by mouth Every 8 (Eight) Hours As Needed for Mild Pain . 60 tablet 12   • SUMAtriptan (IMITREX) 50 MG tablet Take one tablet at onset of headache. May repeat dose one time in 2 hours if headache not relieved. 27 tablet 3     No facility-administered medications prior to visit.       Review of Systems  I have reviewed 12 systems with patient. Findings were negative except what is noted below and/or in history of present illness.    Objective     Visit Vitals  /72 (BP Location: Left arm)   Pulse 80   Ht 170.2 cm (67\")   Wt 125 kg (276 lb)   SpO2 99%   BMI 43.23 kg/m²     Physical Exam  Vitals and nursing note reviewed.   Constitutional:       General: She is not in acute distress.     Appearance: She is well-developed.   HENT:      Head: Normocephalic and atraumatic.      Nose: Nose normal.   Eyes:      General:         Right eye: No discharge.         Left eye: No discharge.      Conjunctiva/sclera: Conjunctivae normal.      Pupils: Pupils are equal, round, and reactive to light.   Neck:      Thyroid: No thyromegaly.      Trachea: No tracheal deviation.   Cardiovascular:      Rate and Rhythm: Normal rate and regular rhythm.      Heart sounds: Normal heart sounds. No murmur heard.  Pulmonary:      Effort: " Pulmonary effort is normal. No respiratory distress.      Breath sounds: Normal breath sounds. No wheezing or rales.   Chest:      Chest wall: No tenderness.   Breasts:      Right: No inverted nipple, mass, nipple discharge, skin change or tenderness.      Left: No inverted nipple, mass, nipple discharge, skin change or tenderness.       Abdominal:      General: Bowel sounds are normal. There is no distension.      Palpations: Abdomen is soft. There is no mass.      Tenderness: There is no abdominal tenderness.      Hernia: No hernia is present.   Musculoskeletal:         General: No deformity. Normal range of motion.   Lymphadenopathy:      Cervical: No cervical adenopathy.   Skin:     General: Skin is warm and dry.   Neurological:      Mental Status: She is alert and oriented to person, place, and time.      Deep Tendon Reflexes: Reflexes are normal and symmetric.   Psychiatric:         Behavior: Behavior normal.         Thought Content: Thought content normal.         Judgment: Judgment normal.       Results for orders placed or performed in visit on 03/16/22   Magnesium    Specimen: Blood   Result Value Ref Range    Magnesium 2.2 1.6 - 2.6 mg/dL   Lipid Panel    Specimen: Blood   Result Value Ref Range    Total Cholesterol 163 0 - 200 mg/dL    Triglycerides 102 0 - 150 mg/dL    HDL Cholesterol 48 40 - 60 mg/dL    LDL Cholesterol  96 0 - 100 mg/dL    VLDL Cholesterol 19 5 - 40 mg/dL    LDL/HDL Ratio 1.97    Comprehensive Metabolic Panel    Specimen: Blood   Result Value Ref Range    Glucose 86 65 - 99 mg/dL    BUN 14 6 - 20 mg/dL    Creatinine 0.81 0.57 - 1.00 mg/dL    Sodium 138 136 - 145 mmol/L    Potassium 4.7 3.5 - 5.2 mmol/L    Chloride 104 98 - 107 mmol/L    CO2 26.2 22.0 - 29.0 mmol/L    Calcium 9.6 8.6 - 10.5 mg/dL    Total Protein 6.8 6.0 - 8.5 g/dL    Albumin 4.30 3.50 - 5.20 g/dL    ALT (SGPT) 17 1 - 33 U/L    AST (SGOT) 39 (H) 1 - 32 U/L    Alkaline Phosphatase 56 39 - 117 U/L    Total Bilirubin 0.4  0.0 - 1.2 mg/dL    Globulin 2.5 gm/dL    A/G Ratio 1.7 g/dL    BUN/Creatinine Ratio 17.3 7.0 - 25.0    Anion Gap 7.8 5.0 - 15.0 mmol/L    eGFR 96.6 >60.0 mL/min/1.73   CBC Auto Differential    Specimen: Blood   Result Value Ref Range    WBC 4.68 3.40 - 10.80 10*3/mm3    RBC 4.93 3.77 - 5.28 10*6/mm3    Hemoglobin 13.6 12.0 - 15.9 g/dL    Hematocrit 42.7 34.0 - 46.6 %    MCV 86.6 79.0 - 97.0 fL    MCH 27.6 26.6 - 33.0 pg    MCHC 31.9 31.5 - 35.7 g/dL    RDW 12.7 12.3 - 15.4 %    RDW-SD 39.1 37.0 - 54.0 fl    MPV 10.4 6.0 - 12.0 fL    Platelets 261 140 - 450 10*3/mm3   Manual Differential    Specimen: Blood   Result Value Ref Range    Neutrophil % 57.0 42.7 - 76.0 %    Lymphocyte % 35.0 19.6 - 45.3 %    Monocyte % 6.0 5.0 - 12.0 %    Eosinophil % 2.0 0.3 - 6.2 %    Neutrophils Absolute 2.67 1.70 - 7.00 10*3/mm3    Lymphocytes Absolute 1.64 0.70 - 3.10 10*3/mm3    Monocytes Absolute 0.28 0.10 - 0.90 10*3/mm3    Eosinophils Absolute 0.09 0.00 - 0.40 10*3/mm3    Anisocytosis Slight/1+ None Seen    WBC Morphology Normal Normal    Platelet Morphology Normal Normal   Urinalysis With Culture If Indicated - Urine, Clean Catch    Specimen: Urine, Clean Catch   Result Value Ref Range    Color, UA Yellow Yellow, Straw    Appearance, UA Clear Clear    pH, UA 6.0 5.0 - 8.0    Specific Gravity, UA 1.022 1.005 - 1.030    Glucose, UA Negative Negative    Ketones, UA Negative Negative    Bilirubin, UA Negative Negative    Blood, UA Negative Negative    Protein, UA Negative Negative    Leuk Esterase, UA Negative Negative    Nitrite, UA Negative Negative    Urobilinogen, UA 0.2 E.U./dL 0.2 - 1.0 E.U./dL      Notes brought forward are reviewed and updated if indicated.     Assessment/Plan   Problems Addressed this Visit        Neuro    Migraine    Relevant Medications    SUMAtriptan (IMITREX) 50 MG tablet      Other Visit Diagnoses     Annual physical exam    -  Primary      Diagnoses       Codes Comments    Annual physical exam    -   Primary ICD-10-CM: Z00.00  ICD-9-CM: V70.0     Migraine without aura and without status migrainosus, not intractable     ICD-10-CM: G43.009  ICD-9-CM: 346.10           Continue current medications. Recommended weight loss and exercise.     Age-appropriate counseling is provided.     New Medications Ordered This Visit   Medications   • SUMAtriptan (IMITREX) 50 MG tablet     Sig: Take one tablet at onset of headache. May repeat dose one time in 2 hours if headache not relieved.     Dispense:  9 tablet     Refill:  5     Return in about 1 year (around 3/18/2023) for Annual physical.        This document has been electronically signed by Missy Ramirez MD on March 18, 2022 14:46 CDT

## 2022-03-18 NOTE — PATIENT INSTRUCTIONS
Calorie Counting for Weight Loss  Calories are units of energy. Your body needs a certain number of calories from food to keep going throughout the day. When you eat or drink more calories than your body needs, your body stores the extra calories mostly as fat. When you eat or drink fewer calories than your body needs, your body burns fat to get the energy it needs.  Calorie counting means keeping track of how many calories you eat and drink each day. Calorie counting can be helpful if you need to lose weight. If you eat fewer calories than your body needs, you should lose weight. Ask your health care provider what a healthy weight is for you.  For calorie counting to work, you will need to eat the right number of calories each day to lose a healthy amount of weight per week. A dietitian can help you figure out how many calories you need in a day and will suggest ways to reach your calorie goal.  A healthy amount of weight to lose each week is usually 1-2 lb (0.5-0.9 kg). This usually means that your daily calorie intake should be reduced by 500-750 calories.  Eating 1,200-1,500 calories a day can help most women lose weight.  Eating 1,500-1,800 calories a day can help most men lose weight.  What do I need to know about calorie counting?  Work with your health care provider or dietitian to determine how many calories you should get each day. To meet your daily calorie goal, you will need to:  Find out how many calories are in each food that you would like to eat. Try to do this before you eat.  Decide how much of the food you plan to eat.  Keep a food log. Do this by writing down what you ate and how many calories it had.  To successfully lose weight, it is important to balance calorie counting with a healthy lifestyle that includes regular activity.  Where do I find calorie information?  The number of calories in a food can be found on a Nutrition Facts label. If a food does not have a Nutrition Facts label, try to  look up the calories online or ask your dietitian for help.  Remember that calories are listed per serving. If you choose to have more than one serving of a food, you will have to multiply the calories per serving by the number of servings you plan to eat. For example, the label on a package of bread might say that a serving size is 1 slice and that there are 90 calories in a serving. If you eat 1 slice, you will have eaten 90 calories. If you eat 2 slices, you will have eaten 180 calories.    How do I keep a food log?  After each time that you eat, record the following in your food log as soon as possible:  What you ate. Be sure to include toppings, sauces, and other extras on the food.  How much you ate. This can be measured in cups, ounces, or number of items.  How many calories were in each food and drink.  The total number of calories in the food you ate.  Keep your food log near you, such as in a pocket-sized notebook or on an valeria or website on your mobile phone. Some programs will calculate calories for you and show you how many calories you have left to meet your daily goal.  What are some portion-control tips?  Know how many calories are in a serving. This will help you know how many servings you can have of a certain food.  Use a measuring cup to measure serving sizes. You could also try weighing out portions on a kitchen scale. With time, you will be able to estimate serving sizes for some foods.  Take time to put servings of different foods on your favorite plates or in your favorite bowls and cups so you know what a serving looks like.  Try not to eat straight from a food's packaging, such as from a bag or box. Eating straight from the package makes it hard to see how much you are eating and can lead to overeating. Put the amount you would like to eat in a cup or on a plate to make sure you are eating the right portion.  Use smaller plates, glasses, and bowls for smaller portions and to prevent  overeating.  Try not to multitask. For example, avoid watching TV or using your computer while eating. If it is time to eat, sit down at a table and enjoy your food. This will help you recognize when you are full. It will also help you be more mindful of what and how much you are eating.  What are tips for following this plan?  Reading food labels  Check the calorie count compared with the serving size. The serving size may be smaller than what you are used to eating.  Check the source of the calories. Try to choose foods that are high in protein, fiber, and vitamins, and low in saturated fat, trans fat, and sodium.  Shopping  Read nutrition labels while you shop. This will help you make healthy decisions about which foods to buy.  Pay attention to nutrition labels for low-fat or fat-free foods. These foods sometimes have the same number of calories or more calories than the full-fat versions. They also often have added sugar, starch, or salt to make up for flavor that was removed with the fat.  Make a grocery list of lower-calorie foods and stick to it.  Cooking  Try to cook your favorite foods in a healthier way. For example, try baking instead of frying.  Use low-fat dairy products.  Meal planning  Use more fruits and vegetables. One-half of your plate should be fruits and vegetables.  Include lean proteins, such as chicken, turkey, and fish.  Lifestyle  Each week, aim to do one of the followin minutes of moderate exercise, such as walking.  75 minutes of vigorous exercise, such as running.  General information  Know how many calories are in the foods you eat most often. This will help you calculate calorie counts faster.  Find a way of tracking calories that works for you. Get creative. Try different apps or programs if writing down calories does not work for you.  What foods should I eat?  Eat nutritious foods. It is better to have a nutritious, high-calorie food, such as an avocado, than a food with few  nutrients, such as a bag of potato chips.  Use your calories on foods and drinks that will fill you up and will not leave you hungry soon after eating.  Examples of foods that fill you up are nuts and nut butters, vegetables, lean proteins, and high-fiber foods such as whole grains. High-fiber foods are foods with more than 5 g of fiber per serving.  Pay attention to calories in drinks. Low-calorie drinks include water and unsweetened drinks.  The items listed above may not be a complete list of foods and beverages you can eat. Contact a dietitian for more information.    What foods should I limit?  Limit foods or drinks that are not good sources of vitamins, minerals, or protein or that are high in unhealthy fats. These include:  Candy.  Other sweets.  Sodas, specialty coffee drinks, alcohol, and juice.  The items listed above may not be a complete list of foods and beverages you should avoid. Contact a dietitian for more information.  How do I count calories when eating out?  Pay attention to portions. Often, portions are much larger when eating out. Try these tips to keep portions smaller:  Consider sharing a meal instead of getting your own.  If you get your own meal, eat only half of it. Before you start eating, ask for a container and put half of your meal into it.  When available, consider ordering smaller portions from the menu instead of full portions.  Pay attention to your food and drink choices. Knowing the way food is cooked and what is included with the meal can help you eat fewer calories.  If calories are listed on the menu, choose the lower-calorie options.  Choose dishes that include vegetables, fruits, whole grains, low-fat dairy products, and lean proteins.  Choose items that are boiled, broiled, grilled, or steamed. Avoid items that are buttered, battered, fried, or served with cream sauce. Items labeled as crispy are usually fried, unless stated otherwise.  Choose water, low-fat milk,  unsweetened iced tea, or other drinks without added sugar. If you want an alcoholic beverage, choose a lower-calorie option, such as a glass of wine or light beer.  Ask for dressings, sauces, and syrups on the side. These are usually high in calories, so you should limit the amount you eat.  If you want a salad, choose a garden salad and ask for grilled meats. Avoid extra toppings such as sommer, cheese, or fried items. Ask for the dressing on the side, or ask for olive oil and vinegar or lemon to use as dressing.  Estimate how many servings of a food you are given. Knowing serving sizes will help you be aware of how much food you are eating at restaurants.  Where to find more information  Centers for Disease Control and Prevention: www.cdc.gov  [place] Department of Agriculture: myplate.gov  Summary  Calorie counting means keeping track of how many calories you eat and drink each day. If you eat fewer calories than your body needs, you should lose weight.  A healthy amount of weight to lose per week is usually 1-2 lb (0.5-0.9 kg). This usually means reducing your daily calorie intake by 500-750 calories.  The number of calories in a food can be found on a Nutrition Facts label. If a food does not have a Nutrition Facts label, try to look up the calories online or ask your dietitian for help.  Use smaller plates, glasses, and bowls for smaller portions and to prevent overeating.  Use your calories on foods and drinks that will fill you up and not leave you hungry shortly after a meal.  This information is not intended to replace advice given to you by your health care provider. Make sure you discuss any questions you have with your health care provider.  Document Revised: 01/28/2021 Document Reviewed: 01/28/2021  [employer] Patient Education © 2021 [employer]  Check on tetanus vaccine at pharmacy or with insurance.

## 2022-07-11 RX ORDER — CLOTRIMAZOLE AND BETAMETHASONE DIPROPIONATE 10; .64 MG/G; MG/G
CREAM TOPICAL
Qty: 45 G | Refills: 2 | Status: SHIPPED | OUTPATIENT
Start: 2022-07-11

## 2023-03-17 ENCOUNTER — LAB (OUTPATIENT)
Dept: LAB | Facility: HOSPITAL | Age: 38
End: 2023-03-17
Payer: COMMERCIAL

## 2023-03-17 DIAGNOSIS — Z00.00 ANNUAL PHYSICAL EXAM: ICD-10-CM

## 2023-03-17 LAB
ALBUMIN SERPL-MCNC: 3.9 G/DL (ref 3.5–5.2)
ALBUMIN/GLOB SERPL: 1.4 G/DL
ALP SERPL-CCNC: 60 U/L (ref 39–117)
ALT SERPL W P-5'-P-CCNC: 17 U/L (ref 1–33)
ANION GAP SERPL CALCULATED.3IONS-SCNC: 11.1 MMOL/L (ref 5–15)
AST SERPL-CCNC: 29 U/L (ref 1–32)
BACTERIA UR QL AUTO: ABNORMAL /HPF
BILIRUB SERPL-MCNC: 0.3 MG/DL (ref 0–1.2)
BILIRUB UR QL STRIP: NEGATIVE
BUN SERPL-MCNC: 12 MG/DL (ref 6–20)
BUN/CREAT SERPL: 14.5 (ref 7–25)
CALCIUM SPEC-SCNC: 9.3 MG/DL (ref 8.6–10.5)
CHLORIDE SERPL-SCNC: 103 MMOL/L (ref 98–107)
CHOLEST SERPL-MCNC: 163 MG/DL (ref 0–200)
CLARITY UR: ABNORMAL
CO2 SERPL-SCNC: 23.9 MMOL/L (ref 22–29)
COLOR UR: YELLOW
CREAT SERPL-MCNC: 0.83 MG/DL (ref 0.57–1)
EGFRCR SERPLBLD CKD-EPI 2021: 93.3 ML/MIN/1.73
GLOBULIN UR ELPH-MCNC: 2.8 GM/DL
GLUCOSE SERPL-MCNC: 102 MG/DL (ref 65–99)
GLUCOSE UR STRIP-MCNC: NEGATIVE MG/DL
HDLC SERPL-MCNC: 46 MG/DL (ref 40–60)
HGB UR QL STRIP.AUTO: NEGATIVE
HYALINE CASTS UR QL AUTO: ABNORMAL /LPF
KETONES UR QL STRIP: NEGATIVE
LDLC SERPL CALC-MCNC: 94 MG/DL (ref 0–100)
LDLC/HDLC SERPL: 1.99 {RATIO}
LEUKOCYTE ESTERASE UR QL STRIP.AUTO: ABNORMAL
NITRITE UR QL STRIP: NEGATIVE
PH UR STRIP.AUTO: 6 [PH] (ref 5–9)
POTASSIUM SERPL-SCNC: 4.6 MMOL/L (ref 3.5–5.2)
PROT SERPL-MCNC: 6.7 G/DL (ref 6–8.5)
PROT UR QL STRIP: NEGATIVE
RBC # UR STRIP: ABNORMAL /HPF
REF LAB TEST METHOD: ABNORMAL
SODIUM SERPL-SCNC: 138 MMOL/L (ref 136–145)
SP GR UR STRIP: 1.01 (ref 1–1.03)
SQUAMOUS #/AREA URNS HPF: ABNORMAL /HPF
TRIGL SERPL-MCNC: 127 MG/DL (ref 0–150)
UROBILINOGEN UR QL STRIP: ABNORMAL
VLDLC SERPL-MCNC: 23 MG/DL (ref 5–40)
WBC # UR STRIP: ABNORMAL /HPF

## 2023-03-17 PROCEDURE — 36415 COLL VENOUS BLD VENIPUNCTURE: CPT

## 2023-03-17 PROCEDURE — 80061 LIPID PANEL: CPT

## 2023-03-17 PROCEDURE — 80053 COMPREHEN METABOLIC PANEL: CPT

## 2023-03-17 PROCEDURE — 81001 URINALYSIS AUTO W/SCOPE: CPT

## 2023-03-20 ENCOUNTER — LAB (OUTPATIENT)
Dept: LAB | Facility: HOSPITAL | Age: 38
End: 2023-03-20
Payer: COMMERCIAL

## 2023-03-20 ENCOUNTER — OFFICE VISIT (OUTPATIENT)
Dept: FAMILY MEDICINE CLINIC | Facility: CLINIC | Age: 38
End: 2023-03-20
Payer: COMMERCIAL

## 2023-03-20 VITALS
DIASTOLIC BLOOD PRESSURE: 70 MMHG | BODY MASS INDEX: 44.36 KG/M2 | SYSTOLIC BLOOD PRESSURE: 104 MMHG | WEIGHT: 282.6 LBS | OXYGEN SATURATION: 98 % | HEART RATE: 77 BPM | HEIGHT: 67 IN

## 2023-03-20 DIAGNOSIS — R53.83 OTHER FATIGUE: ICD-10-CM

## 2023-03-20 DIAGNOSIS — E55.9 VITAMIN D DEFICIENCY: ICD-10-CM

## 2023-03-20 DIAGNOSIS — Z00.00 ANNUAL PHYSICAL EXAM: Primary | ICD-10-CM

## 2023-03-20 DIAGNOSIS — Z00.00 ANNUAL PHYSICAL EXAM: ICD-10-CM

## 2023-03-20 DIAGNOSIS — G43.009 MIGRAINE WITHOUT AURA AND WITHOUT STATUS MIGRAINOSUS, NOT INTRACTABLE: ICD-10-CM

## 2023-03-20 PROCEDURE — 82306 VITAMIN D 25 HYDROXY: CPT

## 2023-03-20 PROCEDURE — 36415 COLL VENOUS BLD VENIPUNCTURE: CPT

## 2023-03-20 PROCEDURE — 85025 COMPLETE CBC W/AUTO DIFF WBC: CPT

## 2023-03-20 PROCEDURE — 82607 VITAMIN B-12: CPT

## 2023-03-20 PROCEDURE — 84443 ASSAY THYROID STIM HORMONE: CPT

## 2023-03-20 PROCEDURE — 84439 ASSAY OF FREE THYROXINE: CPT

## 2023-03-20 RX ORDER — FLUCONAZOLE 200 MG/1
200 TABLET ORAL DAILY
Qty: 3 TABLET | Refills: 0 | Status: SHIPPED | OUTPATIENT
Start: 2023-03-20

## 2023-03-20 RX ORDER — TOPIRAMATE 25 MG/1
25 TABLET ORAL 2 TIMES DAILY
Qty: 30 TABLET | Refills: 5 | Status: SHIPPED | OUTPATIENT
Start: 2023-03-20

## 2023-03-20 RX ORDER — SUMATRIPTAN 50 MG/1
TABLET, FILM COATED ORAL
Qty: 9 TABLET | Refills: 5 | Status: SHIPPED | OUTPATIENT
Start: 2023-03-20

## 2023-03-20 RX ORDER — NYSTATIN 100000 U/G
1 CREAM TOPICAL 2 TIMES DAILY
Qty: 30 G | Refills: 1 | Status: SHIPPED | OUTPATIENT
Start: 2023-03-20

## 2023-03-20 NOTE — PROGRESS NOTES
"Subjective   Madeleine Brooks is a 37 y.o. female.   Chief Complaint   Patient presents with   • Annual Exam     History of Present Illness     For annual wellness exam. Records reviewed. Recent labs, xrays reviewed and medications reconciled. Is having 5-6 migraines a month lasting less than a day.  Has been having a lot of fatigue. Struggling with her weight.    The following portions of the patient's history were reviewed and updated as appropriate: allergies, current medications, past social history and problem list.    Outpatient Medications Prior to Visit   Medication Sig Dispense Refill   • clotrimazole-betamethasone (LOTRISONE) 1-0.05 % cream APPLY EXTERNALLY TO THE AFFECTED AREA TWICE DAILY AS DIRECTED AS NEEDED FOR RASH 45 g 2   • SUMAtriptan (IMITREX) 50 MG tablet Take one tablet at onset of headache. May repeat dose one time in 2 hours if headache not relieved. 9 tablet 5     No facility-administered medications prior to visit.       Review of Systems  I have reviewed 12 systems with patient. Findings were negative except what is noted below and/or in history of present illness.     Objective   Visit Vitals  /70   Pulse 77   Ht 170.2 cm (67\")   Wt 128 kg (282 lb 9.6 oz)   SpO2 98%   BMI 44.26 kg/m²     Physical Exam  Vitals and nursing note reviewed.   Constitutional:       General: She is not in acute distress.     Appearance: She is well-developed.   HENT:      Head: Normocephalic and atraumatic.      Nose: Nose normal.   Eyes:      General:         Right eye: No discharge.         Left eye: No discharge.      Conjunctiva/sclera: Conjunctivae normal.      Pupils: Pupils are equal, round, and reactive to light.   Neck:      Thyroid: No thyromegaly.      Trachea: No tracheal deviation.   Cardiovascular:      Rate and Rhythm: Normal rate and regular rhythm.      Heart sounds: Normal heart sounds. No murmur heard.  Pulmonary:      Effort: Pulmonary effort is normal. No respiratory distress. "      Breath sounds: Normal breath sounds. No wheezing or rales.   Chest:      Chest wall: No tenderness.   Breasts:     Right: No inverted nipple, mass, nipple discharge, skin change or tenderness.      Left: No inverted nipple, mass, nipple discharge, skin change or tenderness.   Abdominal:      General: Bowel sounds are normal. There is no distension.      Palpations: Abdomen is soft. There is no mass.      Tenderness: There is no abdominal tenderness.      Hernia: No hernia is present.       Musculoskeletal:         General: No deformity. Normal range of motion.   Lymphadenopathy:      Cervical: No cervical adenopathy.   Skin:     General: Skin is warm and dry.   Neurological:      Mental Status: She is alert and oriented to person, place, and time.      Deep Tendon Reflexes: Reflexes are normal and symmetric.   Psychiatric:         Behavior: Behavior normal.         Thought Content: Thought content normal.         Judgment: Judgment normal.         Notes brought forward are reviewed and updated if indicated.     Assessment & Plan   Problems Addressed this Visit        Neuro    Migraine    Relevant Medications    topiramate (Topamax) 25 MG tablet    SUMAtriptan (IMITREX) 50 MG tablet   Other Visit Diagnoses     Annual physical exam    -  Primary    Relevant Orders    TSH    T4, Free    Vitamin B12    Vitamin D,25-Hydroxy    CBC & Differential    Other fatigue        Relevant Orders    TSH    T4, Free    Vitamin B12    Vitamin D,25-Hydroxy    CBC & Differential    Vitamin D deficiency        Relevant Orders    Vitamin D,25-Hydroxy      Diagnoses       Codes Comments    Annual physical exam    -  Primary ICD-10-CM: Z00.00  ICD-9-CM: V70.0     Other fatigue     ICD-10-CM: R53.83  ICD-9-CM: 780.79     Vitamin D deficiency     ICD-10-CM: E55.9  ICD-9-CM: 268.9     Migraine without aura and without status migrainosus, not intractable     ICD-10-CM: G43.009  ICD-9-CM: 346.10           Continue current medications.   Start Topamax for migraine prevention. Instructions given regarding proper use and potential risks and side effects. Advised to recheck if is having any issues with this medication.      New Medications Ordered This Visit   Medications   • fluconazole (Diflucan) 200 MG tablet     Sig: Take 1 tablet by mouth Daily.     Dispense:  3 tablet     Refill:  0   • nystatin (MYCOSTATIN) 465265 UNIT/GM cream     Sig: Apply 1 application topically to the appropriate area as directed 2 (Two) Times a Day.     Dispense:  30 g     Refill:  1   • topiramate (Topamax) 25 MG tablet     Sig: Take 1 tablet by mouth 2 (Two) Times a Day.     Dispense:  30 tablet     Refill:  5   • SUMAtriptan (IMITREX) 50 MG tablet     Sig: Take one tablet at onset of headache. May repeat dose one time in 2 hours if headache not relieved.     Dispense:  9 tablet     Refill:  5     Return for Annual physical.        This document has been electronically signed by Missy Ramirez MD on March 20, 2023 17:13 CDT

## 2023-03-21 LAB
25(OH)D3 SERPL-MCNC: 26.6 NG/ML (ref 30–100)
BASOPHILS # BLD AUTO: 0.05 10*3/MM3 (ref 0–0.2)
BASOPHILS NFR BLD AUTO: 0.6 % (ref 0–1.5)
DEPRECATED RDW RBC AUTO: 40.7 FL (ref 37–54)
EOSINOPHIL # BLD AUTO: 0.16 10*3/MM3 (ref 0–0.4)
EOSINOPHIL NFR BLD AUTO: 2 % (ref 0.3–6.2)
ERYTHROCYTE [DISTWIDTH] IN BLOOD BY AUTOMATED COUNT: 13.1 % (ref 12.3–15.4)
HCT VFR BLD AUTO: 41.1 % (ref 34–46.6)
HGB BLD-MCNC: 13.5 G/DL (ref 12–15.9)
IMM GRANULOCYTES # BLD AUTO: 0.02 10*3/MM3 (ref 0–0.05)
IMM GRANULOCYTES NFR BLD AUTO: 0.2 % (ref 0–0.5)
LYMPHOCYTES # BLD AUTO: 2.66 10*3/MM3 (ref 0.7–3.1)
LYMPHOCYTES NFR BLD AUTO: 33 % (ref 19.6–45.3)
MCH RBC QN AUTO: 28 PG (ref 26.6–33)
MCHC RBC AUTO-ENTMCNC: 32.8 G/DL (ref 31.5–35.7)
MCV RBC AUTO: 85.1 FL (ref 79–97)
MONOCYTES # BLD AUTO: 0.67 10*3/MM3 (ref 0.1–0.9)
MONOCYTES NFR BLD AUTO: 8.3 % (ref 5–12)
NEUTROPHILS NFR BLD AUTO: 4.5 10*3/MM3 (ref 1.7–7)
NEUTROPHILS NFR BLD AUTO: 55.9 % (ref 42.7–76)
NRBC BLD AUTO-RTO: 0 /100 WBC (ref 0–0.2)
PLATELET # BLD AUTO: 291 10*3/MM3 (ref 140–450)
PMV BLD AUTO: 10.8 FL (ref 6–12)
RBC # BLD AUTO: 4.83 10*6/MM3 (ref 3.77–5.28)
T4 FREE SERPL-MCNC: 1.14 NG/DL (ref 0.93–1.7)
TSH SERPL DL<=0.05 MIU/L-ACNC: 0.94 UIU/ML (ref 0.27–4.2)
VIT B12 BLD-MCNC: 443 PG/ML (ref 211–946)
WBC NRBC COR # BLD: 8.06 10*3/MM3 (ref 3.4–10.8)

## 2023-03-23 ENCOUNTER — TELEPHONE (OUTPATIENT)
Dept: FAMILY MEDICINE CLINIC | Facility: CLINIC | Age: 38
End: 2023-03-23
Payer: COMMERCIAL

## 2023-03-23 NOTE — PROGRESS NOTES
Per Dr. Ramirez, Ms. Brooks has been called with recent lab results & recommendations.  Continue current medications and follow-up as planned or sooner if any problems.

## 2023-03-23 NOTE — TELEPHONE ENCOUNTER
Per Dr. Ramirez, Ms. Brooks has been called with recent lab results & recommendations.  Continue current medications and follow-up as planned or sooner if any problems.       ----- Message from Missy Ramirez MD sent at 3/22/2023  3:08 PM CDT -----  Call and tell labs are okay except her vitamin D is low.  Start taking vitamin D3 1000 units daily OTC.

## 2023-05-31 RX ORDER — CLOTRIMAZOLE AND BETAMETHASONE DIPROPIONATE 10; .64 MG/G; MG/G
CREAM TOPICAL
Qty: 45 G | Refills: 2 | Status: SHIPPED | OUTPATIENT
Start: 2023-05-31

## (undated) DEVICE — TRY SPINE PENCAN 24GA X4IN

## (undated) DEVICE — SUT ETHLN 4/0 30IN 626H

## (undated) DEVICE — DRSNG TELFA PAD NONADH STR 1S 3X8IN

## (undated) DEVICE — GLV SURG SENSICARE GREEN W/ALOE PF LF 6.5 STRL

## (undated) DEVICE — GARMENT,MEDLINE,DVT,INT,CALF,MED, GEN2: Brand: MEDLINE

## (undated) DEVICE — PAD,ABDOMINAL,8"X10",ST,LF: Brand: MEDLINE

## (undated) DEVICE — GLV SURG MICROTOUCH LTX SZ7 STRL

## (undated) DEVICE — SUT VIC PLS 0 CTX 36IN UD VCP978H

## (undated) DEVICE — PK C/SECT 60

## (undated) DEVICE — SUT GUT CHRM 1 CTX 36IN 905H